# Patient Record
Sex: MALE | Race: BLACK OR AFRICAN AMERICAN | NOT HISPANIC OR LATINO | URBAN - METROPOLITAN AREA
[De-identification: names, ages, dates, MRNs, and addresses within clinical notes are randomized per-mention and may not be internally consistent; named-entity substitution may affect disease eponyms.]

---

## 2017-06-01 ENCOUNTER — OUTPATIENT (OUTPATIENT)
Dept: OUTPATIENT SERVICES | Facility: HOSPITAL | Age: 57
LOS: 1 days | End: 2017-06-01
Payer: MEDICAID

## 2017-06-01 DIAGNOSIS — K27.7 CHRONIC PEPTIC ULCER, SITE UNSPECIFIED, WITHOUT HEMORRHAGE OR PERFORATION: Chronic | ICD-10-CM

## 2017-06-01 PROCEDURE — G9001: CPT

## 2017-06-08 DIAGNOSIS — R69 ILLNESS, UNSPECIFIED: ICD-10-CM

## 2017-12-07 VITALS
SYSTOLIC BLOOD PRESSURE: 142 MMHG | DIASTOLIC BLOOD PRESSURE: 76 MMHG | TEMPERATURE: 99 F | HEART RATE: 80 BPM | RESPIRATION RATE: 18 BRPM | OXYGEN SATURATION: 99 %

## 2017-12-07 NOTE — H&P ADULT - NEGATIVE CARDIOVASCULAR SYMPTOMS
no orthopnea/no peripheral edema/no claudication/no paroxysmal nocturnal dyspnea/no palpitations/no chest pain

## 2017-12-07 NOTE — H&P ADULT - ASSESSMENT
56yo male, former smoker, with PMHx of HTN, schizophrenia/psychosis presents for cardiac catheterization with possible intervention if clinically indicated due to patient's risk factors, CCS Angina Class III Equivalent symptoms and abnormal NST.     -Pt remains compliant with his ASA/Plavix regimen prescribed 11/30/2017. Did not take today and will give prior to procedure  -Pt reports he adheres to his current medication regimen and importance of ASA/Plavix discussed with patient if intervention performed.   Risks & benefits of procedure and alternative therapy have been explained to the patient including but not limited to: allergic reaction, bleeding w/possible need for blood transfusion, infection, renal and vascular compromise, limb damage, arrhythmia, stroke, vessel dissection/perforation, Myocardial infarction, emergent CABG. Informed consent obtained and in chart. 58yo male, former smoker, with PMHx of HTN, schizophrenia/psychosis presents for cardiac catheterization with possible intervention if clinically indicated due to patient's risk factors, CCS Angina Class III Equivalent symptoms and abnormal NST.     -Pt remains compliant with his ASA/Plavix regimen prescribed 11/30/2017. Did not take today and will give prior to procedure  -Pt reports he adheres to his current medication regimen and importance of ASA/Plavix discussed with patient if intervention performed.   -WBC today 13.3K, remains afebrile, asymptomatic. No leukocytosis present on labs from 12/1/2017. Dr. Zhou aware  Risks & benefits of procedure and alternative therapy have been explained to the patient including but not limited to: allergic reaction, bleeding w/possible need for blood transfusion, infection, renal and vascular compromise, limb damage, arrhythmia, stroke, vessel dissection/perforation, Myocardial infarction, emergent CABG. Informed consent obtained and in chart. 58yo male, former smoker, with PMHx of HTN, schizophrenia/psychosis presents for cardiac catheterization with possible intervention if clinically indicated due to patient's risk factors, CCS Angina Class III Equivalent symptoms on amlodipine and abnormal NST.     -Pt remains compliant with his ASA/Plavix regimen prescribed 11/30/2017. Did not take today and will give prior to procedure  -Pt reports he adheres to his current medication regimen and importance of ASA/Plavix discussed with patient if intervention performed.   -WBC today 13.3K, remains afebrile, asymptomatic. No leukocytosis present on labs from 12/1/2017. Dr. Zhou aware  Risks & benefits of procedure and alternative therapy have been explained to the patient including but not limited to: allergic reaction, bleeding w/possible need for blood transfusion, infection, renal and vascular compromise, limb damage, arrhythmia, stroke, vessel dissection/perforation, Myocardial infarction, emergent CABG. Informed consent obtained and in chart.

## 2017-12-07 NOTE — H&P ADULT - HISTORY OF PRESENT ILLNESS
56yo male with PMHx of HTN who presented to the cardiologist with c/o HUNTER on moderate exertion.    Patient underwent a NST 11/29" revealing a small to moderate, partially reversible myocardial perfusion defect in the inferior and inferolateral wall, EF 60%.    Echo 11/14: revealing EF 54%, grade 1 diastolic dysfunction.     In light of patients CCS III anginal equivilant symptoms and abnormal NST he is now referred to [facility] for recommended cardiac cath. Hx obtained from Padmaja (sister) - poor historian, please confirm symptoms w/ patient on arrival.    Confirm meds on arrival.    58yo male, former smoker, with PMHx of HTN, ?schizophrenia/psychosis who presented to the cardiologist with c/o SOB upon walking 2 blocks and releived upon resting.  Patient also with complaint of cough at night which his sister states is from his smoking history (denies inhalers).  Denies any CP, palpitations, dizziness, syncope, LE swelling, orthopnea, fevers, chills.  Patient underwent a NST 11/29: revealing a small to moderate, partially reversible myocardial perfusion defect in the inferior and inferolateral wall, EF 60%.  Echo 11/14: revealing EF 54%, grade 1 diastolic dysfunction.     In light of patients CCS III anginal equivalent symptoms and abnormal NST he is now referred to [facility] for recommended cardiac cath. Hx obtained from Padmaja (sister) - poor historian and patient    56yo male, former smoker, with PMHx of HTN, schizophrenia/psychosis who presented to the cardiologist with c/o SOB upon walking 2 blocks relieved with rest. Patient also with complaint of cough at night which his sister states is from his smoking history (denies inhalers).  Denies any CP, palpitations, dizziness, syncope, LE swelling, orthopnea, fevers, chills.  Patient underwent a NST 11/29 revealing a small to moderate, partially reversible myocardial perfusion defect in the inferior and inferolateral wall, EF 60%.  Echo 11/14/17 revealed EF 54%, grade 1 diastolic dysfunction. In light of patients CCS III anginal equivalent symptoms and abnormal NST he is now referred to [facility] for recommended cardiac cath.

## 2017-12-07 NOTE — H&P ADULT - RS GEN PE MLT RESP DETAILS PC
clear to auscultation bilaterally/normal/respirations non-labored/airway patent/breath sounds equal/good air movement

## 2017-12-19 ENCOUNTER — INPATIENT (INPATIENT)
Facility: HOSPITAL | Age: 57
LOS: 0 days | Discharge: HOME | DRG: 247 | End: 2017-12-20
Attending: INTERNAL MEDICINE | Admitting: INTERNAL MEDICINE
Payer: MEDICAID

## 2017-12-19 DIAGNOSIS — K27.7 CHRONIC PEPTIC ULCER, SITE UNSPECIFIED, WITHOUT HEMORRHAGE OR PERFORATION: Chronic | ICD-10-CM

## 2017-12-19 LAB
ALBUMIN SERPL ELPH-MCNC: 4.3 G/DL — SIGNIFICANT CHANGE UP (ref 3.3–5)
ALP SERPL-CCNC: 66 U/L — SIGNIFICANT CHANGE UP (ref 40–120)
ALT FLD-CCNC: 28 U/L — SIGNIFICANT CHANGE UP (ref 10–45)
ANION GAP SERPL CALC-SCNC: 13 MMOL/L — SIGNIFICANT CHANGE UP (ref 5–17)
APTT BLD: 42.1 SEC — HIGH (ref 27.5–37.4)
AST SERPL-CCNC: 22 U/L — SIGNIFICANT CHANGE UP (ref 10–40)
BASOPHILS NFR BLD AUTO: 0.3 % — SIGNIFICANT CHANGE UP (ref 0–2)
BILIRUB SERPL-MCNC: 0.5 MG/DL — SIGNIFICANT CHANGE UP (ref 0.2–1.2)
BUN SERPL-MCNC: 12 MG/DL — SIGNIFICANT CHANGE UP (ref 7–23)
CALCIUM SERPL-MCNC: 8.7 MG/DL — SIGNIFICANT CHANGE UP (ref 8.4–10.5)
CHLORIDE SERPL-SCNC: 103 MMOL/L — SIGNIFICANT CHANGE UP (ref 96–108)
CHOLEST SERPL-MCNC: 189 MG/DL — SIGNIFICANT CHANGE UP (ref 10–199)
CO2 SERPL-SCNC: 23 MMOL/L — SIGNIFICANT CHANGE UP (ref 22–31)
CREAT SERPL-MCNC: 0.93 MG/DL — SIGNIFICANT CHANGE UP (ref 0.5–1.3)
CRP SERPL-MCNC: 0.2 MG/DL — SIGNIFICANT CHANGE UP (ref 0–0.4)
EOSINOPHIL NFR BLD AUTO: 2.9 % — SIGNIFICANT CHANGE UP (ref 0–6)
GLUCOSE SERPL-MCNC: 134 MG/DL — HIGH (ref 70–99)
HBA1C BLD-MCNC: 6.4 % — HIGH (ref 4–5.6)
HCT VFR BLD CALC: 37.5 % — LOW (ref 39–50)
HDLC SERPL-MCNC: 37 MG/DL — LOW (ref 40–125)
HGB BLD-MCNC: 13.5 G/DL — SIGNIFICANT CHANGE UP (ref 13–17)
INR BLD: 0.97 — SIGNIFICANT CHANGE UP (ref 0.88–1.16)
LIPID PNL WITH DIRECT LDL SERPL: 105 MG/DL — SIGNIFICANT CHANGE UP
LYMPHOCYTES # BLD AUTO: 20.6 % — SIGNIFICANT CHANGE UP (ref 13–44)
MCHC RBC-ENTMCNC: 32.7 PG — SIGNIFICANT CHANGE UP (ref 27–34)
MCHC RBC-ENTMCNC: 36 G/DL — SIGNIFICANT CHANGE UP (ref 32–36)
MCV RBC AUTO: 90.8 FL — SIGNIFICANT CHANGE UP (ref 80–100)
MONOCYTES NFR BLD AUTO: 8.3 % — SIGNIFICANT CHANGE UP (ref 2–14)
NEUTROPHILS NFR BLD AUTO: 67.9 % — SIGNIFICANT CHANGE UP (ref 43–77)
PLATELET # BLD AUTO: 231 K/UL — SIGNIFICANT CHANGE UP (ref 150–400)
POTASSIUM SERPL-MCNC: 4.2 MMOL/L — SIGNIFICANT CHANGE UP (ref 3.5–5.3)
POTASSIUM SERPL-SCNC: 4.2 MMOL/L — SIGNIFICANT CHANGE UP (ref 3.5–5.3)
PROT SERPL-MCNC: 7 G/DL — SIGNIFICANT CHANGE UP (ref 6–8.3)
PROTHROM AB SERPL-ACNC: 10.8 SEC — SIGNIFICANT CHANGE UP (ref 9.8–12.7)
RBC # BLD: 4.13 M/UL — LOW (ref 4.2–5.8)
RBC # FLD: 13.1 % — SIGNIFICANT CHANGE UP (ref 10.3–16.9)
SODIUM SERPL-SCNC: 139 MMOL/L — SIGNIFICANT CHANGE UP (ref 135–145)
TOTAL CHOLESTEROL/HDL RATIO MEASUREMENT: 5.1 RATIO — SIGNIFICANT CHANGE UP (ref 3.4–9.6)
TRIGL SERPL-MCNC: 235 MG/DL — HIGH (ref 10–149)
WBC # BLD: 13.3 K/UL — HIGH (ref 3.8–10.5)
WBC # FLD AUTO: 13.3 K/UL — HIGH (ref 3.8–10.5)

## 2017-12-19 PROCEDURE — 93010 ELECTROCARDIOGRAM REPORT: CPT

## 2017-12-19 PROCEDURE — 93458 L HRT ARTERY/VENTRICLE ANGIO: CPT | Mod: 26,XU

## 2017-12-19 PROCEDURE — 92928 PRQ TCAT PLMT NTRAC ST 1 LES: CPT | Mod: LD

## 2017-12-19 PROCEDURE — 93571 IV DOP VEL&/PRESS C FLO 1ST: CPT | Mod: 26,LD

## 2017-12-19 RX ORDER — BENZTROPINE MESYLATE 1 MG
2 TABLET ORAL AT BEDTIME
Qty: 0 | Refills: 0 | Status: DISCONTINUED | OUTPATIENT
Start: 2017-12-19 | End: 2017-12-20

## 2017-12-19 RX ORDER — ASPIRIN/CALCIUM CARB/MAGNESIUM 324 MG
81 TABLET ORAL DAILY
Qty: 0 | Refills: 0 | Status: DISCONTINUED | OUTPATIENT
Start: 2017-12-20 | End: 2017-12-20

## 2017-12-19 RX ORDER — CLOPIDOGREL BISULFATE 75 MG/1
75 TABLET, FILM COATED ORAL ONCE
Qty: 0 | Refills: 0 | Status: COMPLETED | OUTPATIENT
Start: 2017-12-19 | End: 2017-12-19

## 2017-12-19 RX ORDER — ASPIRIN/CALCIUM CARB/MAGNESIUM 324 MG
325 TABLET ORAL ONCE
Qty: 0 | Refills: 0 | Status: COMPLETED | OUTPATIENT
Start: 2017-12-19 | End: 2017-12-19

## 2017-12-19 RX ORDER — AMLODIPINE BESYLATE 2.5 MG/1
2.5 TABLET ORAL DAILY
Qty: 0 | Refills: 0 | Status: DISCONTINUED | OUTPATIENT
Start: 2017-12-19 | End: 2017-12-20

## 2017-12-19 RX ORDER — SODIUM CHLORIDE 9 MG/ML
1000 INJECTION INTRAMUSCULAR; INTRAVENOUS; SUBCUTANEOUS
Qty: 0 | Refills: 0 | Status: DISCONTINUED | OUTPATIENT
Start: 2017-12-19 | End: 2017-12-20

## 2017-12-19 RX ORDER — LISINOPRIL 2.5 MG/1
20 TABLET ORAL DAILY
Qty: 0 | Refills: 0 | Status: DISCONTINUED | OUTPATIENT
Start: 2017-12-19 | End: 2017-12-20

## 2017-12-19 RX ORDER — SODIUM CHLORIDE 9 MG/ML
500 INJECTION INTRAMUSCULAR; INTRAVENOUS; SUBCUTANEOUS
Qty: 0 | Refills: 0 | Status: DISCONTINUED | OUTPATIENT
Start: 2017-12-19 | End: 2017-12-19

## 2017-12-19 RX ORDER — HALOPERIDOL DECANOATE 100 MG/ML
10 INJECTION INTRAMUSCULAR AT BEDTIME
Qty: 0 | Refills: 0 | Status: DISCONTINUED | OUTPATIENT
Start: 2017-12-19 | End: 2017-12-20

## 2017-12-19 RX ORDER — CLOPIDOGREL BISULFATE 75 MG/1
75 TABLET, FILM COATED ORAL DAILY
Qty: 0 | Refills: 0 | Status: DISCONTINUED | OUTPATIENT
Start: 2017-12-20 | End: 2017-12-20

## 2017-12-19 RX ADMIN — Medication 2 MILLIGRAM(S): at 22:18

## 2017-12-19 RX ADMIN — SODIUM CHLORIDE 75 MILLILITER(S): 9 INJECTION INTRAMUSCULAR; INTRAVENOUS; SUBCUTANEOUS at 08:14

## 2017-12-19 RX ADMIN — SODIUM CHLORIDE 75 MILLILITER(S): 9 INJECTION INTRAMUSCULAR; INTRAVENOUS; SUBCUTANEOUS at 10:00

## 2017-12-19 RX ADMIN — AMLODIPINE BESYLATE 2.5 MILLIGRAM(S): 2.5 TABLET ORAL at 12:04

## 2017-12-19 RX ADMIN — HALOPERIDOL DECANOATE 10 MILLIGRAM(S): 100 INJECTION INTRAMUSCULAR at 22:18

## 2017-12-19 RX ADMIN — CLOPIDOGREL BISULFATE 75 MILLIGRAM(S): 75 TABLET, FILM COATED ORAL at 08:13

## 2017-12-19 RX ADMIN — Medication 325 MILLIGRAM(S): at 08:14

## 2017-12-19 RX ADMIN — LISINOPRIL 20 MILLIGRAM(S): 2.5 TABLET ORAL at 12:04

## 2017-12-20 ENCOUNTER — TRANSCRIPTION ENCOUNTER (OUTPATIENT)
Age: 57
End: 2017-12-20

## 2017-12-20 VITALS
DIASTOLIC BLOOD PRESSURE: 72 MMHG | HEART RATE: 86 BPM | OXYGEN SATURATION: 95 % | RESPIRATION RATE: 16 BRPM | SYSTOLIC BLOOD PRESSURE: 121 MMHG | TEMPERATURE: 98 F

## 2017-12-20 LAB
ANION GAP SERPL CALC-SCNC: 13 MMOL/L — SIGNIFICANT CHANGE UP (ref 5–17)
BUN SERPL-MCNC: 13 MG/DL — SIGNIFICANT CHANGE UP (ref 7–23)
CALCIUM SERPL-MCNC: 9.1 MG/DL — SIGNIFICANT CHANGE UP (ref 8.4–10.5)
CHLORIDE SERPL-SCNC: 102 MMOL/L — SIGNIFICANT CHANGE UP (ref 96–108)
CO2 SERPL-SCNC: 24 MMOL/L — SIGNIFICANT CHANGE UP (ref 22–31)
CREAT SERPL-MCNC: 0.9 MG/DL — SIGNIFICANT CHANGE UP (ref 0.5–1.3)
GLUCOSE BLDC GLUCOMTR-MCNC: 111 MG/DL — HIGH (ref 70–99)
GLUCOSE SERPL-MCNC: 129 MG/DL — HIGH (ref 70–99)
HCT VFR BLD CALC: 41.7 % — SIGNIFICANT CHANGE UP (ref 39–50)
HGB BLD-MCNC: 14.6 G/DL — SIGNIFICANT CHANGE UP (ref 13–17)
MAGNESIUM SERPL-MCNC: 2.1 MG/DL — SIGNIFICANT CHANGE UP (ref 1.6–2.6)
MCHC RBC-ENTMCNC: 32.1 PG — SIGNIFICANT CHANGE UP (ref 27–34)
MCHC RBC-ENTMCNC: 35 G/DL — SIGNIFICANT CHANGE UP (ref 32–36)
MCV RBC AUTO: 91.6 FL — SIGNIFICANT CHANGE UP (ref 80–100)
PLATELET # BLD AUTO: 232 K/UL — SIGNIFICANT CHANGE UP (ref 150–400)
POTASSIUM SERPL-MCNC: 4.3 MMOL/L — SIGNIFICANT CHANGE UP (ref 3.5–5.3)
POTASSIUM SERPL-SCNC: 4.3 MMOL/L — SIGNIFICANT CHANGE UP (ref 3.5–5.3)
RBC # BLD: 4.55 M/UL — SIGNIFICANT CHANGE UP (ref 4.2–5.8)
RBC # FLD: 12.6 % — SIGNIFICANT CHANGE UP (ref 10.3–16.9)
SODIUM SERPL-SCNC: 139 MMOL/L — SIGNIFICANT CHANGE UP (ref 135–145)
WBC # BLD: 8.8 K/UL — SIGNIFICANT CHANGE UP (ref 3.8–10.5)
WBC # FLD AUTO: 8.8 K/UL — SIGNIFICANT CHANGE UP (ref 3.8–10.5)

## 2017-12-20 PROCEDURE — 36415 COLL VENOUS BLD VENIPUNCTURE: CPT

## 2017-12-20 PROCEDURE — 80061 LIPID PANEL: CPT

## 2017-12-20 PROCEDURE — 86140 C-REACTIVE PROTEIN: CPT

## 2017-12-20 PROCEDURE — 85610 PROTHROMBIN TIME: CPT

## 2017-12-20 PROCEDURE — C1769: CPT

## 2017-12-20 PROCEDURE — 80053 COMPREHEN METABOLIC PANEL: CPT

## 2017-12-20 PROCEDURE — C1889: CPT

## 2017-12-20 PROCEDURE — C1887: CPT

## 2017-12-20 PROCEDURE — C1725: CPT

## 2017-12-20 PROCEDURE — 83036 HEMOGLOBIN GLYCOSYLATED A1C: CPT

## 2017-12-20 PROCEDURE — 85025 COMPLETE CBC W/AUTO DIFF WBC: CPT

## 2017-12-20 PROCEDURE — 93010 ELECTROCARDIOGRAM REPORT: CPT

## 2017-12-20 PROCEDURE — 80048 BASIC METABOLIC PNL TOTAL CA: CPT

## 2017-12-20 PROCEDURE — 85027 COMPLETE CBC AUTOMATED: CPT

## 2017-12-20 PROCEDURE — C1874: CPT

## 2017-12-20 PROCEDURE — 83735 ASSAY OF MAGNESIUM: CPT

## 2017-12-20 PROCEDURE — 99235 HOSP IP/OBS SAME DATE MOD 70: CPT

## 2017-12-20 PROCEDURE — 82962 GLUCOSE BLOOD TEST: CPT

## 2017-12-20 PROCEDURE — 93005 ELECTROCARDIOGRAM TRACING: CPT

## 2017-12-20 PROCEDURE — 85730 THROMBOPLASTIN TIME PARTIAL: CPT

## 2017-12-20 RX ORDER — ASPIRIN/CALCIUM CARB/MAGNESIUM 324 MG
1 TABLET ORAL
Qty: 0 | Refills: 0 | COMMUNITY

## 2017-12-20 RX ORDER — LISINOPRIL 2.5 MG/1
1 TABLET ORAL
Qty: 0 | Refills: 0 | COMMUNITY

## 2017-12-20 RX ORDER — CLOPIDOGREL BISULFATE 75 MG/1
1 TABLET, FILM COATED ORAL
Qty: 30 | Refills: 11
Start: 2017-12-20 | End: 2018-12-14

## 2017-12-20 RX ORDER — ATORVASTATIN CALCIUM 80 MG/1
1 TABLET, FILM COATED ORAL
Qty: 30 | Refills: 3
Start: 2017-12-20 | End: 2018-04-18

## 2017-12-20 RX ORDER — CLOPIDOGREL BISULFATE 75 MG/1
1 TABLET, FILM COATED ORAL
Qty: 0 | Refills: 0 | COMMUNITY

## 2017-12-20 RX ORDER — ASPIRIN/CALCIUM CARB/MAGNESIUM 324 MG
1 TABLET ORAL
Qty: 30 | Refills: 11
Start: 2017-12-20 | End: 2018-12-14

## 2017-12-20 RX ORDER — LISINOPRIL 2.5 MG/1
1 TABLET ORAL
Qty: 30 | Refills: 2
Start: 2017-12-20 | End: 2018-03-19

## 2017-12-20 RX ADMIN — CLOPIDOGREL BISULFATE 75 MILLIGRAM(S): 75 TABLET, FILM COATED ORAL at 12:12

## 2017-12-20 RX ADMIN — Medication 81 MILLIGRAM(S): at 12:12

## 2017-12-20 RX ADMIN — LISINOPRIL 20 MILLIGRAM(S): 2.5 TABLET ORAL at 05:13

## 2017-12-20 RX ADMIN — AMLODIPINE BESYLATE 2.5 MILLIGRAM(S): 2.5 TABLET ORAL at 05:13

## 2017-12-20 NOTE — DISCHARGE NOTE ADULT - HOSPITAL COURSE
Patient is a 58yo M, former smoker, with PMHx of HTN and schizophrenia/psychosis who presented to his cardiologist with c/o SOB upon walking 2 blocks relieved with rest. Patient also with complaint of cough at night which his sister states is from his smoking history (denies inhalers).  Denies any CP, palpitations, dizziness, syncope, LE swelling, orthopnea, fevers, chills.  Patient underwent a NST 11/29 revealing a small to moderate, partially reversible myocardial perfusion defect in the inferior and inferolateral wall, EF 60%.  Echo 11/14/17 revealed EF 54%, grade 1 diastolic dysfunction. In light of patients CCS III anginal equivalent symptoms and abnormal NST he was now referred to [facility] for recommended cardiac cath. Patient underwent cardiac cath on 12/19/17 and received OLIVIER midLAD (FFR 0.78), EF 55%, right radial access. Patient was seen and examined this AM and was asymptomatic without complaints. Patient's VSS, labs wnl and no events overnight on telemetry. Patient is stable for discharge per Dr. Mora. Patient was diagnosed with pre-DM for which he has been instructed to follow up with his PMD for close blood glucose monitoring. Patient was started on lipitor 40mg QHS for HLD. Patient's medications, including asa/plavix, have been e-prescribed to his preferred pharmacy. Patient has been given appropriate discharge instructions including medication regimen, access site management and follow up.    Temp 97.8F, HR 74bpm, /75, RR 16, O2 sat 95% RA  Gen: NAD, A&O x 3  Cards: RRR, clear S1 and S2 without murmur  Pulm: CTA b/l without w/r/r  Abd: soft, NT  Ext: No LE edema or ulcerations b/l

## 2017-12-20 NOTE — DISCHARGE NOTE ADULT - MEDICATION SUMMARY - MEDICATIONS TO TAKE
I will START or STAY ON the medications listed below when I get home from the hospital:    Aspirin Enteric Coated 81 mg oral delayed release tablet  -- 1 tab(s) by mouth once a day  -- Indication: For Coronary artery disease, keeps stent open    lisinopril 20 mg oral tablet  -- 1 tab(s) by mouth once a day  -- Indication: For High blood pressure    Lipitor 40 mg oral tablet  -- 1 tab(s) by mouth once a day   -- Avoid grapefruit and grapefruit juice while taking this medication.  Do not take this drug if you are pregnant.  It is very important that you take or use this exactly as directed.  Do not skip doses or discontinue unless directed by your doctor.  Obtain medical advice before taking any non-prescription drugs as some may affect the action of this medication.  Take with food or milk.    -- Indication: For High cholesterol    benztropine 2 mg oral tablet  -- 1 tab(s) by mouth once a day (at bedtime)  -- Indication: For as prescribed    Plavix 75 mg oral tablet  -- 1 tab(s) by mouth once a day  -- Indication: For Coronary artery disease, keeps stent open    haloperidol 10 mg oral tablet  -- 1 tab(s) by mouth once a day (at bedtime)  -- Indication: For as prescribed    amLODIPine 2.5 mg oral tablet  -- 1 tab(s) by mouth once a day  -- Indication: For High blood pressure I will START or STAY ON the medications listed below when I get home from the hospital:    Aspirin Enteric Coated 81 mg oral delayed release tablet  -- 1 tab(s) by mouth once a day  -- Indication: For Coronary artery disease, keeps stent open    lisinopril 20 mg oral tablet  -- 1 tab(s) by mouth once a day  -- Indication: For High blood pressure, protects the heart    Lipitor 40 mg oral tablet  -- 1 tab(s) by mouth once a day   -- Avoid grapefruit and grapefruit juice while taking this medication.  Do not take this drug if you are pregnant.  It is very important that you take or use this exactly as directed.  Do not skip doses or discontinue unless directed by your doctor.  Obtain medical advice before taking any non-prescription drugs as some may affect the action of this medication.  Take with food or milk.    -- Indication: For High cholesterol    benztropine 2 mg oral tablet  -- 1 tab(s) by mouth once a day (at bedtime)  -- Indication: For as prescribed    Plavix 75 mg oral tablet  -- 1 tab(s) by mouth once a day  -- Indication: For Coronary artery disease, keeps stent open    haloperidol 10 mg oral tablet  -- 1 tab(s) by mouth once a day (at bedtime)  -- Indication: For as prescribed    amLODIPine 2.5 mg oral tablet  -- 1 tab(s) by mouth once a day  -- Indication: For High blood pressure

## 2017-12-20 NOTE — DISCHARGE NOTE ADULT - CARE PLAN
Principal Discharge DX:	Coronary artery disease  Goal:	continue medications, follow up  Instructions for follow-up, activity and diet:	You underwent a cardiac angiogram and received a stent to the left anterior descending artery. PLEASE CONTINUE ASPIRIN 81MG DAILY AND PLAVIX 75MG DAILY. DO NOT STOP THESE MEDICATIONS FOR ANY REASON AS THEY ARE KEEPING YOUR STENT OPEN AND PREVENTING A HEART ATTACK. Avoid strenuous activity or heavy lifting for the next five days. Do not take a bath or swim for the next five days; you may shower. For any bleeding or hematoma formation (hardened blood collection under the skin) at the access site of right radial please hold pressure and go to the emergency room. Please follow up with Dr. Zhou at [facility] in 1-2 weeks. For recurrent chest pain, please call your doctor or go to the emergency room.  Secondary Diagnosis:	Hypertension  Goal:	continue medications  Instructions for follow-up, activity and diet:	Please continue medications as listed to keep your blood pressure controlled. For blood pressure that is too high or too low please see your doctor or go to the emergency room as necessary.  Secondary Diagnosis:	Hyperlipidemia  Goal:	continue medications  Instructions for follow-up, activity and diet:	Please start lipitor (atorvastatin) to keep your cholesterol low. High cholesterol contributes to heart disease.  Secondary Diagnosis:	Pre-diabetes  Goal:	follow up  Instructions for follow-up, activity and diet:	You have been diagnosed with pre-diabetes. Your hemoglobin A1C (measures blood sugar over three months) was 6.4. You are diagnosed with diabetes if your hemoglobin A1C is 6.5 or higher. Please maintain a low carbohydrate diet. Follow up with your primary care doctor for close monitoring of your blood glucose.

## 2017-12-20 NOTE — DISCHARGE NOTE ADULT - CARE PROVIDER_API CALL
Owen Zhou), Cardiovascular Disease; Internal Medicine; Interventional Cardiology  [address] [place] [address]  Phone: [phone]  Fax: [phone]

## 2017-12-20 NOTE — DISCHARGE NOTE ADULT - PATIENT PORTAL LINK FT
“You can access the FollowHealth Patient Portal, offered by [facility], by registering with the following website: [email or web address]”

## 2017-12-20 NOTE — DISCHARGE NOTE ADULT - PLAN OF CARE
continue medications, follow up You underwent a cardiac angiogram and received a stent to the left anterior descending artery. PLEASE CONTINUE ASPIRIN 81MG DAILY AND PLAVIX 75MG DAILY. DO NOT STOP THESE MEDICATIONS FOR ANY REASON AS THEY ARE KEEPING YOUR STENT OPEN AND PREVENTING A HEART ATTACK. Avoid strenuous activity or heavy lifting for the next five days. Do not take a bath or swim for the next five days; you may shower. For any bleeding or hematoma formation (hardened blood collection under the skin) at the access site of right radial please hold pressure and go to the emergency room. Please follow up with Dr. Zhou at [facility] in 1-2 weeks. For recurrent chest pain, please call your doctor or go to the emergency room. continue medications Please continue medications as listed to keep your blood pressure controlled. For blood pressure that is too high or too low please see your doctor or go to the emergency room as necessary. Please start lipitor (atorvastatin) to keep your cholesterol low. High cholesterol contributes to heart disease. follow up You have been diagnosed with pre-diabetes. Your hemoglobin A1C (measures blood sugar over three months) was 6.4. You are diagnosed with diabetes if your hemoglobin A1C is 6.5 or higher. Please maintain a low carbohydrate diet. Follow up with your primary care doctor for close monitoring of your blood glucose.

## 2017-12-26 DIAGNOSIS — Z79.82 LONG TERM (CURRENT) USE OF ASPIRIN: ICD-10-CM

## 2017-12-26 DIAGNOSIS — Z87.891 PERSONAL HISTORY OF NICOTINE DEPENDENCE: ICD-10-CM

## 2017-12-26 DIAGNOSIS — I10 ESSENTIAL (PRIMARY) HYPERTENSION: ICD-10-CM

## 2017-12-26 DIAGNOSIS — Z87.11 PERSONAL HISTORY OF PEPTIC ULCER DISEASE: ICD-10-CM

## 2017-12-26 DIAGNOSIS — F20.9 SCHIZOPHRENIA, UNSPECIFIED: ICD-10-CM

## 2017-12-26 DIAGNOSIS — I25.110 ATHEROSCLEROTIC HEART DISEASE OF NATIVE CORONARY ARTERY WITH UNSTABLE ANGINA PECTORIS: ICD-10-CM

## 2017-12-26 DIAGNOSIS — R73.03 PREDIABETES: ICD-10-CM

## 2017-12-26 DIAGNOSIS — Z86.59 PERSONAL HISTORY OF OTHER MENTAL AND BEHAVIORAL DISORDERS: ICD-10-CM

## 2017-12-26 DIAGNOSIS — E78.5 HYPERLIPIDEMIA, UNSPECIFIED: ICD-10-CM

## 2019-09-17 ENCOUNTER — RX RENEWAL (OUTPATIENT)
Age: 59
End: 2019-09-17

## 2019-11-13 ENCOUNTER — RX RENEWAL (OUTPATIENT)
Age: 59
End: 2019-11-13

## 2021-03-29 PROBLEM — F29 UNSPECIFIED PSYCHOSIS NOT DUE TO A SUBSTANCE OR KNOWN PHYSIOLOGICAL CONDITION: Chronic | Status: ACTIVE | Noted: 2017-12-07

## 2021-03-29 PROBLEM — I10 ESSENTIAL (PRIMARY) HYPERTENSION: Chronic | Status: ACTIVE | Noted: 2017-12-07

## 2021-04-02 VITALS
DIASTOLIC BLOOD PRESSURE: 85 MMHG | HEART RATE: 73 BPM | SYSTOLIC BLOOD PRESSURE: 123 MMHG | TEMPERATURE: 98 F | WEIGHT: 195.99 LBS | OXYGEN SATURATION: 95 % | HEIGHT: 69 IN | RESPIRATION RATE: 16 BRPM

## 2021-04-02 RX ORDER — CHLORHEXIDINE GLUCONATE 213 G/1000ML
1 SOLUTION TOPICAL ONCE
Refills: 0 | Status: DISCONTINUED | OUTPATIENT
Start: 2021-04-06 | End: 2021-04-20

## 2021-04-02 NOTE — H&P ADULT - ASSESSMENT
59 y/o M, poor historian, with PMH of schizophrenia/psychosis, HTN, HLD, DM, CAD s/p PCI mLAD @ [facility] 2017 who presents for left heart cardiac catheterization due to patient's risk factors and abnormal NST.       - EKG:  NSR @ 74 bpm, no acute ST -T wave changes    - ASA: III          Mallampati: III  - H/H stable: 14.2/42.2       Platelets/Coags stable. Cr: 0.81   - Patient denies hematochieza, hematuria, easy bruising, or signs of bleeding.   - Patient loaded with Aspirin 81 mg PO x 1 and Plavix 75 mg PO x 1   - Patient started on IV NS @ 75 cc/hr x 4 hours   - Hgb a1c: 6.7      Started on: MISS   - Patient is a suitable candidate for moderate sedation.     Risks & benefits of procedure and alternative therapy have been explained to the patient including but not limited to: allergic reaction, bleeding w/possible need for blood transfusion, infection, renal and vascular compromise, limb damage, arrhythmia, stroke, vessel dissection/perforation, Myocardial infarction, emergent CABG. Informed consent obtained and in chart.

## 2021-04-02 NOTE — H&P ADULT - RS GEN PE MLT RESP DETAILS PC
airway patent/breath sounds equal/good air movement/respirations non-labored/clear to auscultation bilaterally/no chest wall tenderness/no rales/no rhonchi

## 2021-04-02 NOTE — H&P ADULT - HISTORY OF PRESENT ILLNESS
Cardiologist: Dr. Zhou  Pharmacy:   Escort:   COVID:    59 y/o M with PMH of schizophrenia/psychosis,  HTN, HLD, DM, , CAD s/p PCI LAD @ [facility] 2017  Who presented to his cardiologist Dr. Zhou with CC of HUNTRE X ….  Pt. denies any …..   ECHO 11/23/20 revealed EF 54 %, grade 1 diastolic dysfunction. NST 11/23/20 as per MD note revealed small to moderate partially reversible myocardial perfusion defect of the inferior and inferolateral wall, EF 60 %.   In light of patients risk factors, CCS class __ sx and abnormal NST, pt is now referred for cardiac cath with possible intervention.    Cardiologist: Dr. Zhou  Pharmacy:   Escort:   COVID:   *Verify Meds*  59 y/o M, poor historian, with PMH of schizophrenia/psychosis, HTN, HLD, DM, CAD s/p PCI mLAD @ [facility] 2017 who presented to outpatient cardiologist Dr. Zhou with complaints of   Who presented to his cardiologist Dr. Zhou with CC of HUNTER X ….  Pt. denies any …..   ECHO 11/23/20 revealed EF 54 %, grade 1 diastolic dysfunction. NST 11/23/20 as per MD note revealed small to moderate partially reversible myocardial perfusion defect of the inferior and inferolateral wall, EF 60 %.   In light of patients risk factors, CCS class __ sx and abnormal NST, pt is now referred for cardiac cath with possible intervention.    Cardiologist: Dr. Zhou  [facility]  Escort: family vs "will set something up"  COVID: Shanika 4/6 AM  *Verify Meds*  59 y/o M, poor historian, with PMH of schizophrenia/psychosis, HTN, HLD, DM, CAD s/p PCI mLAD @ [facility] 2017 who presented to outpatient cardiologist Dr. Zhou for routine follow up. Per patient he has been complietly ASX, however per MD note, patient complained of HUNTER. Patient denies CP, SOB, dizziness, diaphoresis, palpitations, orthopnea/PND, abdominal pain, N/V/D, urinary sx, BRBPR, hematuria, melena, LE swelling, recent travel/sick contacts/illness. ECHO 11/23/20 revealed EF 54 %, grade 1 diastolic dysfunction. NST 11/23/20 as per MD note revealed small to moderate partially reversible myocardial perfusion defect of the inferior and inferolateral wall, EF 60 %. In light of patients risk factors and abnormal NST despite being ASX, pt is now referred for cardiac cath with possible intervention.    Cardiologist: Dr. Zhou  [facility]  Escort: family vs "will set something up"  COVID: Shanika 4/6 AM  *Verify Meds*  61 y/o M, poor historian, with PMH of schizophrenia/psychosis, HTN, HLD, DM, CAD s/p PCI mLAD @ [facility] 2017 who presented to outpatient cardiologist Dr. Zhou for routine follow up. Per patient he has been complietly ASX, however per MD note, patient complained of HUNTER. Patient denies CP, SOB, dizziness, diaphoresis, palpitations, orthopnea/PND, abdominal pain, N/V/D, urinary sx, BRBPR, hematuria, melena, LE swelling, recent travel/sick contacts/illness. ECHO 11/23/20 revealed EF 54 %, grade 1 diastolic dysfunction. NST 11/23/20 as per MD note revealed small to moderate partially reversible myocardial perfusion defect of the inferior and inferolateral wall, EF 60 %. In light of patients risk factors and abnormal NST despite being ASX, pt is now referred for cardiac cath with possible intervention.   Cardiac Cath 12/19/17: LM normal, mLAD 60% (FFR 0.78), LCx luminal irregularities, RCA large/dominant w/ luminal irregularities. LVEF normal, EDP 6 Cardiologist: Dr. Zhou  Pharmacy: [facility]  Escort: family vs "will set something up"  COVID: Shanika 4/6 AM  61 y/o M, poor historian, current smoker, with PMH of schizophrenia/psychosis, HTN, HLD, DM, CAD s/p PCI mLAD @ [facility] 2017 who presented to outpatient cardiologist Dr. Zhou for routine follow up. Per patient he has been complietly ASX, however per MD note, patient complained of HUNTER. Patient denies CP, SOB, dizziness, diaphoresis, palpitations, orthopnea/PND, abdominal pain, N/V/D, urinary sx, BRBPR, hematuria, melena, LE swelling, recent travel/sick contacts/illness. ECHO 11/23/20 revealed EF 54 %, grade 1 diastolic dysfunction. NST 11/23/20 as per MD note revealed small to moderate partially reversible myocardial perfusion defect of the inferior and inferolateral wall, EF 60 %. In light of patients risk factors and abnormal NST despite being ASX, pt is now referred for cardiac cath with possible intervention.   Cardiac Cath 12/19/17: LM normal, mLAD 60% (FFR 0.78), LCx luminal irregularities, RCA large/dominant w/ luminal irregularities. LVEF normal, EDP 6

## 2021-04-06 ENCOUNTER — OUTPATIENT (OUTPATIENT)
Dept: OUTPATIENT SERVICES | Facility: HOSPITAL | Age: 61
LOS: 1 days | Discharge: HOME | End: 2021-04-06
Payer: MEDICAID

## 2021-04-06 DIAGNOSIS — K27.7 CHRONIC PEPTIC ULCER, SITE UNSPECIFIED, WITHOUT HEMORRHAGE OR PERFORATION: Chronic | ICD-10-CM

## 2021-04-06 LAB
A1C WITH ESTIMATED AVERAGE GLUCOSE RESULT: 6.7 % — HIGH (ref 4–5.6)
ALBUMIN SERPL ELPH-MCNC: 4.5 G/DL — SIGNIFICANT CHANGE UP (ref 3.3–5)
ALP SERPL-CCNC: 96 U/L — SIGNIFICANT CHANGE UP (ref 40–120)
ALT FLD-CCNC: 24 U/L — SIGNIFICANT CHANGE UP (ref 10–45)
ANION GAP SERPL CALC-SCNC: 10 MMOL/L — SIGNIFICANT CHANGE UP (ref 5–17)
APTT BLD: 30.4 SEC — SIGNIFICANT CHANGE UP (ref 27.5–35.5)
AST SERPL-CCNC: 22 U/L — SIGNIFICANT CHANGE UP (ref 10–40)
BASOPHILS # BLD AUTO: 0.02 K/UL — SIGNIFICANT CHANGE UP (ref 0–0.2)
BASOPHILS NFR BLD AUTO: 0.2 % — SIGNIFICANT CHANGE UP (ref 0–2)
BILIRUB DIRECT SERPL-MCNC: <0.2 MG/DL — SIGNIFICANT CHANGE UP (ref 0–0.2)
BILIRUB INDIRECT FLD-MCNC: SIGNIFICANT CHANGE UP MG/DL (ref 0.2–1)
BILIRUB SERPL-MCNC: 0.5 MG/DL — SIGNIFICANT CHANGE UP (ref 0.2–1.2)
BUN SERPL-MCNC: 16 MG/DL — SIGNIFICANT CHANGE UP (ref 7–23)
CALCIUM SERPL-MCNC: 9 MG/DL — SIGNIFICANT CHANGE UP (ref 8.4–10.5)
CHLORIDE SERPL-SCNC: 103 MMOL/L — SIGNIFICANT CHANGE UP (ref 96–108)
CHOLEST SERPL-MCNC: 126 MG/DL — SIGNIFICANT CHANGE UP
CK MB CFR SERPL CALC: 2.4 NG/ML — SIGNIFICANT CHANGE UP (ref 0–6.7)
CK SERPL-CCNC: 144 U/L — SIGNIFICANT CHANGE UP (ref 30–200)
CO2 SERPL-SCNC: 26 MMOL/L — SIGNIFICANT CHANGE UP (ref 22–31)
CREAT SERPL-MCNC: 0.81 MG/DL — SIGNIFICANT CHANGE UP (ref 0.5–1.3)
EOSINOPHIL # BLD AUTO: 0.24 K/UL — SIGNIFICANT CHANGE UP (ref 0–0.5)
EOSINOPHIL NFR BLD AUTO: 2.6 % — SIGNIFICANT CHANGE UP (ref 0–6)
ESTIMATED AVERAGE GLUCOSE: 146 MG/DL — HIGH (ref 68–114)
GLUCOSE SERPL-MCNC: 134 MG/DL — HIGH (ref 70–99)
HCT VFR BLD CALC: 42.2 % — SIGNIFICANT CHANGE UP (ref 39–50)
HDLC SERPL-MCNC: 38 MG/DL — LOW
HGB BLD-MCNC: 14.2 G/DL — SIGNIFICANT CHANGE UP (ref 13–17)
IMM GRANULOCYTES NFR BLD AUTO: 0.3 % — SIGNIFICANT CHANGE UP (ref 0–1.5)
INR BLD: 0.96 — SIGNIFICANT CHANGE UP (ref 0.88–1.16)
LIPID PNL WITH DIRECT LDL SERPL: 64 MG/DL — SIGNIFICANT CHANGE UP
LYMPHOCYTES # BLD AUTO: 1.83 K/UL — SIGNIFICANT CHANGE UP (ref 1–3.3)
LYMPHOCYTES # BLD AUTO: 19.6 % — SIGNIFICANT CHANGE UP (ref 13–44)
MCHC RBC-ENTMCNC: 31.8 PG — SIGNIFICANT CHANGE UP (ref 27–34)
MCHC RBC-ENTMCNC: 33.6 GM/DL — SIGNIFICANT CHANGE UP (ref 32–36)
MCV RBC AUTO: 94.6 FL — SIGNIFICANT CHANGE UP (ref 80–100)
MONOCYTES # BLD AUTO: 0.68 K/UL — SIGNIFICANT CHANGE UP (ref 0–0.9)
MONOCYTES NFR BLD AUTO: 7.3 % — SIGNIFICANT CHANGE UP (ref 2–14)
NEUTROPHILS # BLD AUTO: 6.55 K/UL — SIGNIFICANT CHANGE UP (ref 1.8–7.4)
NEUTROPHILS NFR BLD AUTO: 70 % — SIGNIFICANT CHANGE UP (ref 43–77)
NON HDL CHOLESTEROL: 88 MG/DL — SIGNIFICANT CHANGE UP
NRBC # BLD: 0 /100 WBCS — SIGNIFICANT CHANGE UP (ref 0–0)
PLATELET # BLD AUTO: 256 K/UL — SIGNIFICANT CHANGE UP (ref 150–400)
POTASSIUM SERPL-MCNC: 4 MMOL/L — SIGNIFICANT CHANGE UP (ref 3.5–5.3)
POTASSIUM SERPL-SCNC: 4 MMOL/L — SIGNIFICANT CHANGE UP (ref 3.5–5.3)
PROT SERPL-MCNC: 7.6 G/DL — SIGNIFICANT CHANGE UP (ref 6–8.3)
PROTHROM AB SERPL-ACNC: 11.5 SEC — SIGNIFICANT CHANGE UP (ref 10.6–13.6)
RBC # BLD: 4.46 M/UL — SIGNIFICANT CHANGE UP (ref 4.2–5.8)
RBC # FLD: 12.5 % — SIGNIFICANT CHANGE UP (ref 10.3–14.5)
SODIUM SERPL-SCNC: 139 MMOL/L — SIGNIFICANT CHANGE UP (ref 135–145)
TRIGL SERPL-MCNC: 118 MG/DL — SIGNIFICANT CHANGE UP
WBC # BLD: 9.35 K/UL — SIGNIFICANT CHANGE UP (ref 3.8–10.5)
WBC # FLD AUTO: 9.35 K/UL — SIGNIFICANT CHANGE UP (ref 3.8–10.5)

## 2021-04-06 PROCEDURE — 85730 THROMBOPLASTIN TIME PARTIAL: CPT

## 2021-04-06 PROCEDURE — C1769: CPT

## 2021-04-06 PROCEDURE — 93458 L HRT ARTERY/VENTRICLE ANGIO: CPT

## 2021-04-06 PROCEDURE — C1887: CPT

## 2021-04-06 PROCEDURE — 93458 L HRT ARTERY/VENTRICLE ANGIO: CPT | Mod: 26

## 2021-04-06 PROCEDURE — 82550 ASSAY OF CK (CPK): CPT

## 2021-04-06 PROCEDURE — 93010 ELECTROCARDIOGRAM REPORT: CPT

## 2021-04-06 PROCEDURE — 83036 HEMOGLOBIN GLYCOSYLATED A1C: CPT

## 2021-04-06 PROCEDURE — 82553 CREATINE MB FRACTION: CPT

## 2021-04-06 PROCEDURE — 82248 BILIRUBIN DIRECT: CPT

## 2021-04-06 PROCEDURE — 36415 COLL VENOUS BLD VENIPUNCTURE: CPT

## 2021-04-06 PROCEDURE — 80053 COMPREHEN METABOLIC PANEL: CPT

## 2021-04-06 PROCEDURE — 85610 PROTHROMBIN TIME: CPT

## 2021-04-06 PROCEDURE — 85025 COMPLETE CBC W/AUTO DIFF WBC: CPT

## 2021-04-06 PROCEDURE — 93005 ELECTROCARDIOGRAM TRACING: CPT

## 2021-04-06 PROCEDURE — 80061 LIPID PANEL: CPT

## 2021-04-06 PROCEDURE — C1894: CPT

## 2021-04-06 RX ORDER — DEXTROSE 50 % IN WATER 50 %
15 SYRINGE (ML) INTRAVENOUS ONCE
Refills: 0 | Status: DISCONTINUED | OUTPATIENT
Start: 2021-04-06 | End: 2021-04-20

## 2021-04-06 RX ORDER — INSULIN LISPRO 100/ML
VIAL (ML) SUBCUTANEOUS ONCE
Refills: 0 | Status: DISCONTINUED | OUTPATIENT
Start: 2021-04-06 | End: 2021-04-20

## 2021-04-06 RX ORDER — ASPIRIN/CALCIUM CARB/MAGNESIUM 324 MG
81 TABLET ORAL ONCE
Refills: 0 | Status: COMPLETED | OUTPATIENT
Start: 2021-04-06 | End: 2021-04-06

## 2021-04-06 RX ORDER — DEXTROSE 50 % IN WATER 50 %
25 SYRINGE (ML) INTRAVENOUS ONCE
Refills: 0 | Status: DISCONTINUED | OUTPATIENT
Start: 2021-04-06 | End: 2021-04-20

## 2021-04-06 RX ORDER — SODIUM CHLORIDE 9 MG/ML
1000 INJECTION INTRAMUSCULAR; INTRAVENOUS; SUBCUTANEOUS
Refills: 0 | Status: DISCONTINUED | OUTPATIENT
Start: 2021-04-06 | End: 2021-04-06

## 2021-04-06 RX ORDER — DEXTROSE 50 % IN WATER 50 %
12.5 SYRINGE (ML) INTRAVENOUS ONCE
Refills: 0 | Status: DISCONTINUED | OUTPATIENT
Start: 2021-04-06 | End: 2021-04-20

## 2021-04-06 RX ORDER — SODIUM CHLORIDE 9 MG/ML
1000 INJECTION, SOLUTION INTRAVENOUS
Refills: 0 | Status: DISCONTINUED | OUTPATIENT
Start: 2021-04-06 | End: 2021-04-20

## 2021-04-06 RX ORDER — CLOPIDOGREL BISULFATE 75 MG/1
75 TABLET, FILM COATED ORAL ONCE
Refills: 0 | Status: COMPLETED | OUTPATIENT
Start: 2021-04-06 | End: 2021-04-06

## 2021-04-06 RX ORDER — GLUCAGON INJECTION, SOLUTION 0.5 MG/.1ML
1 INJECTION, SOLUTION SUBCUTANEOUS ONCE
Refills: 0 | Status: DISCONTINUED | OUTPATIENT
Start: 2021-04-06 | End: 2021-04-20

## 2021-04-06 RX ADMIN — CLOPIDOGREL BISULFATE 75 MILLIGRAM(S): 75 TABLET, FILM COATED ORAL at 11:10

## 2021-04-06 RX ADMIN — SODIUM CHLORIDE 75 MILLILITER(S): 9 INJECTION INTRAMUSCULAR; INTRAVENOUS; SUBCUTANEOUS at 11:09

## 2021-04-06 RX ADMIN — Medication 81 MILLIGRAM(S): at 11:10

## 2021-04-06 NOTE — PROGRESS NOTE ADULT - SUBJECTIVE AND OBJECTIVE BOX
Interventional Cardiology PA SDA Discharge Note    Patient without complaints. Ambulated and voided without difficulties    Afebrile, VSS    PRESCRIPTIONS/HOME MEDICATIONS:  amLODIPine 2.5 mg oral tablet: 1 tab(s) orally once a day  Aspirin Enteric Coated 81 mg oral delayed release tablet: 1 tab(s) orally once a day  atorvastatin 80 mg oral tablet: 1 tab(s) orally once a day  benztropine 2 mg oral tablet: 1 tab(s) orally once a day (at bedtime)  haloperidol 10 mg oral tablet: 1 tab(s) orally once a day (at bedtime)  Janumet 50 mg-1000 mg oral tablet: 1 tab(s) orally 2 times a day  lisinopril 20 mg oral tablet: 1 tab(s) orally once a day  Plavix 75 mg oral tablet: 1 tab(s) orally once a day  sildenafil 50 mg oral tablet: 1 tab(s) orally once a day        CURRENT MEDICATIONS:   chlorhexidine 4% Liquid 1 Application(s) Topical once  dextrose 40% Gel 15 Gram(s) Oral Once  dextrose 5%. 1000 milliLiter(s) IV Continuous <Continuous>  dextrose 5%. 1000 milliLiter(s) IV Continuous <Continuous>  dextrose 50% Injectable 25 Gram(s) IV Push Once  dextrose 50% Injectable 12.5 Gram(s) IV Push Once  dextrose 50% Injectable 25 Gram(s) IV Push Once  glucagon  Injectable 1 milliGRAM(s) IntraMuscular Once  insulin lispro (ADMELOG) corrective regimen sliding scale   SubCutaneous Once        Ext:  Right    Radial :  no  hematoma or bleeding, dressing C/D/I; radial pulse 2+      A/P:    61 y/o M with PMH of schizophrenia/psychosis, HTN, HLD, DM, CAD s/p PCI mLAD @ [facility] 2017 who presents for left heart cardiac catheterization due to patient's risk factors and abnormal NST. He is now s/p cardiac cath 4/6 revealing non-obstructive CAD (minor luminal irregularities, patent mLAD stent)    -continue medical management  -Follow-up with Cardiologist Dr. Zhou in 1-2 weeks  -Stable for discharge as per attending Dr. Zhou after bed rest, pt voids, wrist stable and 30 minutes of ambulation.  -Discharge forms signed and copies in chart   -Discharge Order Entered

## 2021-04-09 DIAGNOSIS — R07.9 CHEST PAIN, UNSPECIFIED: ICD-10-CM

## 2021-04-09 DIAGNOSIS — Z95.5 PRESENCE OF CORONARY ANGIOPLASTY IMPLANT AND GRAFT: ICD-10-CM

## 2021-04-09 DIAGNOSIS — I20.9 ANGINA PECTORIS, UNSPECIFIED: ICD-10-CM

## 2021-04-09 DIAGNOSIS — Z82.49 FAMILY HISTORY OF ISCHEMIC HEART DISEASE AND OTHER DISEASES OF THE CIRCULATORY SYSTEM: ICD-10-CM

## 2021-04-09 DIAGNOSIS — E78.5 HYPERLIPIDEMIA, UNSPECIFIED: ICD-10-CM

## 2021-04-09 DIAGNOSIS — I10 ESSENTIAL (PRIMARY) HYPERTENSION: ICD-10-CM

## 2022-09-07 ENCOUNTER — EMERGENCY (EMERGENCY)
Facility: HOSPITAL | Age: 62
LOS: 0 days | Discharge: HOME | End: 2022-09-07
Attending: STUDENT IN AN ORGANIZED HEALTH CARE EDUCATION/TRAINING PROGRAM

## 2022-09-07 VITALS
RESPIRATION RATE: 18 BRPM | OXYGEN SATURATION: 96 % | TEMPERATURE: 98 F | HEART RATE: 97 BPM | SYSTOLIC BLOOD PRESSURE: 119 MMHG | DIASTOLIC BLOOD PRESSURE: 81 MMHG

## 2022-09-07 VITALS
RESPIRATION RATE: 16 BRPM | OXYGEN SATURATION: 95 % | SYSTOLIC BLOOD PRESSURE: 125 MMHG | DIASTOLIC BLOOD PRESSURE: 83 MMHG | HEIGHT: 69 IN | TEMPERATURE: 98 F | HEART RATE: 104 BPM | WEIGHT: 149.91 LBS

## 2022-09-07 DIAGNOSIS — F29 UNSPECIFIED PSYCHOSIS NOT DUE TO A SUBSTANCE OR KNOWN PHYSIOLOGICAL CONDITION: ICD-10-CM

## 2022-09-07 DIAGNOSIS — S69.91XA UNSPECIFIED INJURY OF RIGHT WRIST, HAND AND FINGER(S), INITIAL ENCOUNTER: ICD-10-CM
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
## 2022-09-07 DIAGNOSIS — I10 ESSENTIAL (PRIMARY) HYPERTENSION: ICD-10-CM

## 2022-09-07 DIAGNOSIS — S60.031A CONTUSION OF RIGHT MIDDLE FINGER WITHOUT DAMAGE TO NAIL, INITIAL ENCOUNTER: ICD-10-CM

## 2022-09-07 DIAGNOSIS — K27.7 CHRONIC PEPTIC ULCER, SITE UNSPECIFIED, WITHOUT HEMORRHAGE OR PERFORATION: Chronic | ICD-10-CM

## 2022-09-07 PROCEDURE — 99283 EMERGENCY DEPT VISIT LOW MDM: CPT

## 2022-09-07 PROCEDURE — 73130 X-RAY EXAM OF HAND: CPT | Mod: 26,RT

## 2022-09-07 RX ORDER — AMLODIPINE BESYLATE 2.5 MG/1
1 TABLET ORAL
Qty: 0 | Refills: 0 | DISCHARGE

## 2022-09-07 RX ORDER — HALOPERIDOL DECANOATE 100 MG/ML
1 INJECTION INTRAMUSCULAR
Qty: 0 | Refills: 0 | DISCHARGE

## 2022-09-07 RX ORDER — ATORVASTATIN CALCIUM 80 MG/1
1 TABLET, FILM COATED ORAL
Qty: 0 | Refills: 0 | DISCHARGE

## 2022-09-07 RX ORDER — LISINOPRIL 2.5 MG/1
1 TABLET ORAL
Qty: 0 | Refills: 0 | DISCHARGE

## 2022-09-07 RX ORDER — SITAGLIPTIN AND METFORMIN HYDROCHLORIDE 500; 50 MG/1; MG/1
1 TABLET, FILM COATED ORAL
Qty: 0 | Refills: 0 | DISCHARGE

## 2022-09-07 RX ORDER — BENZTROPINE MESYLATE 1 MG
1 TABLET ORAL
Qty: 0 | Refills: 0 | DISCHARGE

## 2022-09-07 NOTE — ED PROVIDER NOTE - PATIENT PORTAL LINK FT
You can access the FollowMyHealth Patient Portal offered by [facility] by registering at the following website: [email or web address]. By joining [employer]’s FollowMyHealth portal, you will also be able to view your health information using other applications (apps) compatible with our system.

## 2022-09-07 NOTE — ED ADULT NURSE NOTE - OBJECTIVE STATEMENT
AAox4 pt c/o discoloration to right 3rd finger x2-3 days. Pt reports closing chest on finger. Pt denies numbness and tingling. Pt denies PMH and taking medications at home

## 2022-09-07 NOTE — ED PROVIDER NOTE - PHYSICAL EXAMINATION
General appearance: Nontoxic appearing, conversant, afebrile    Eyes: anicteric sclerae, RANDI, EOMI   HENT: Atraumatic; oropharynx clear, MMM and no ulcerations, no pharyngeal erythema or exudate   Neck: Trachea midline; Full range of motion, supple   Pulm: normal respiratory effort and no intercostal retractions, normal work of breathing   Extremities: No peripheral edema, no gross deformities, FROM x4, 5/5 MS x4, gross sensation intact, 0.5cm hematoma R third digit distal phalanx palmar surface, no nail involvement   Skin: Dry, normal temperature, turgor and texture; no rash   Psych: Appropriate affect, cooperative
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
## 2022-09-07 NOTE — ED PROVIDER NOTE - MUSCULOSKELETAL, MLM
Spine appears normal, right middle finger active ROM at each joint intact 0.5 cm diameter hematoma on palmar surface of distal phalanx right middle finger.

## 2022-09-22 ENCOUNTER — EMERGENCY (EMERGENCY)
Facility: HOSPITAL | Age: 62
LOS: 0 days | Discharge: HOME | End: 2022-09-22
Attending: EMERGENCY MEDICINE

## 2022-09-22 VITALS
RESPIRATION RATE: 18 BRPM | HEART RATE: 71 BPM | OXYGEN SATURATION: 97 % | DIASTOLIC BLOOD PRESSURE: 93 MMHG | SYSTOLIC BLOOD PRESSURE: 158 MMHG | TEMPERATURE: 98 F

## 2022-09-22 VITALS
RESPIRATION RATE: 19 BRPM | HEIGHT: 69 IN | WEIGHT: 197.09 LBS | OXYGEN SATURATION: 99 % | SYSTOLIC BLOOD PRESSURE: 149 MMHG | HEART RATE: 100 BPM | DIASTOLIC BLOOD PRESSURE: 98 MMHG | TEMPERATURE: 98 F

## 2022-09-22 DIAGNOSIS — Z87.19 PERSONAL HISTORY OF OTHER DISEASES OF THE DIGESTIVE SYSTEM: ICD-10-CM

## 2022-09-22 DIAGNOSIS — K27.7 CHRONIC PEPTIC ULCER, SITE UNSPECIFIED, WITHOUT HEMORRHAGE OR PERFORATION: Chronic | ICD-10-CM

## 2022-09-22 DIAGNOSIS — L02.31 CUTANEOUS ABSCESS OF BUTTOCK: ICD-10-CM

## 2022-09-22 DIAGNOSIS — F29 UNSPECIFIED PSYCHOSIS NOT DUE TO A SUBSTANCE OR KNOWN PHYSIOLOGICAL CONDITION: ICD-10-CM

## 2022-09-22 DIAGNOSIS — I10 ESSENTIAL (PRIMARY) HYPERTENSION: ICD-10-CM

## 2022-09-22 DIAGNOSIS — M79.18 MYALGIA, OTHER SITE: ICD-10-CM

## 2022-09-22 PROCEDURE — 10060 I&D ABSCESS SIMPLE/SINGLE: CPT

## 2022-09-22 PROCEDURE — 99283 EMERGENCY DEPT VISIT LOW MDM: CPT | Mod: 25

## 2022-09-22 RX ORDER — IBUPROFEN 200 MG
600 TABLET ORAL ONCE
Refills: 0 | Status: COMPLETED | OUTPATIENT
Start: 2022-09-22 | End: 2022-09-22

## 2022-09-22 RX ORDER — IBUPROFEN 200 MG
1 TABLET ORAL
Qty: 15 | Refills: 0
Start: 2022-09-22 | End: 2022-09-26

## 2022-09-22 RX ADMIN — Medication 600 MILLIGRAM(S): at 10:00

## 2022-09-22 RX ADMIN — Medication 1 TABLET(S): at 10:00

## 2022-09-22 RX ADMIN — Medication 600 MILLIGRAM(S): at 10:10

## 2022-09-22 NOTE — ED PROVIDER NOTE - PATIENT PORTAL LINK FT
You can access the FollowMyHealth Patient Portal offered by [facility] by registering at the following website: [email or web address]. By joining [employer]’s FollowMyHealth portal, you will also be able to view your health information using other applications (apps) compatible with our system.

## 2022-09-22 NOTE — ED PROVIDER NOTE - MUSCULOSKELETAL NEGATIVE STATEMENT, MLM
Follow up with oncology in about a year  Follow up with your primary care physician  
no back pain, no gout, no musculoskeletal pain, no neck pain, and no weakness.

## 2022-09-22 NOTE — ED PROVIDER NOTE - OBJECTIVE STATEMENT
61 years old male walked inc/o swelling bump pain to the right buttock x 1 and 1/2 weeks. Pt denies recent hx of trauma hx of diabetes headache, dizziness, neck/back pain, cough, sob, chest pain, nausea, vomiting, fever, chills, abd pain, dysuria or irregular bowel movements. Pt also denies harmful thoughts to himself or other, or visual/auditory hallucination.

## 2022-09-22 NOTE — ED PROVIDER NOTE - PROGRESS NOTE DETAILS
Pt is alert and oriented x 3 smiling pleasant very comfortable. pt is not septic no hx of diabetes no hx of immunocompromised disease.

## 2022-09-22 NOTE — ED ADULT NURSE NOTE - OBJECTIVE STATEMENT
Pt presents to ed c/o "bump" to his right buttocks. Pt denies fever/chills, redness, n/v/d, dizziness, cp, sob, headache, abdominal pain, numbness/tingling, weakness. Pt has no other complaints. Respirations even and unlabored. No acute distress noted.

## 2023-01-30 NOTE — ED PROVIDER NOTE - CARDIAC, MLM
Normal rate, regular rhythm.  Heart sounds S1, S2.  No murmurs, rubs or gallops. Gabapentin Counseling: I discussed with the patient the risks of gabapentin including but not limited to dizziness, somnolence, fatigue and ataxia.

## 2023-02-16 NOTE — ED PROVIDER NOTE - DISPOSITION TYPE
General Sunscreen Counseling: I recommended a broad spectrum sunscreen with maximum SPF.  Sunscreens should be applied at least 15 minutes prior to expected sun exposure and then every 2 hours after that as long as sun exposure continues. If swimming or exercising sunscreen should be reapplied every 45 minutes to an hour after getting wet or sweating. I also recommended a lip balm with a sunscreen as well. Sun protective clothing can be used in lieu of sunscreen but must be worn the entire time you are exposed to the sun's rays. Products Recommended: Neutrogena, Elta MD, CeraVe, Cetaphil Detail Level: Generalized DISCHARGE

## 2024-02-12 NOTE — ED PROVIDER NOTE - NSFOLLOWUPINSTRUCTIONS_ED_ALL_ED_FT
eyeglasses
Rest, drink plenty of fluids  Advance activity as tolerated  Continue all previously prescribed medications as directed  Follow up with your PMD - bring copies of your results  Return to the ER for severe pain, swelling, redness, infection, or other new or concerning symptoms

## 2024-06-17 ENCOUNTER — EMERGENCY (EMERGENCY)
Facility: HOSPITAL | Age: 64
LOS: 0 days | Discharge: HOME | End: 2024-06-17
Attending: EMERGENCY MEDICINE
Payer: COMMERCIAL

## 2024-06-17 VITALS
TEMPERATURE: 99 F | OXYGEN SATURATION: 98 % | DIASTOLIC BLOOD PRESSURE: 94 MMHG | RESPIRATION RATE: 18 BRPM | HEART RATE: 108 BPM | SYSTOLIC BLOOD PRESSURE: 151 MMHG | WEIGHT: 177.91 LBS

## 2024-06-17 VITALS
TEMPERATURE: 100 F | HEART RATE: 103 BPM | SYSTOLIC BLOOD PRESSURE: 134 MMHG | RESPIRATION RATE: 18 BRPM | DIASTOLIC BLOOD PRESSURE: 91 MMHG | OXYGEN SATURATION: 95 %

## 2024-06-17 DIAGNOSIS — L03.317 CELLULITIS OF BUTTOCK: ICD-10-CM

## 2024-06-17 DIAGNOSIS — M79.18 MYALGIA, OTHER SITE: ICD-10-CM

## 2024-06-17 DIAGNOSIS — R21 RASH AND OTHER NONSPECIFIC SKIN ERUPTION: ICD-10-CM

## 2024-06-17 DIAGNOSIS — K27.7 CHRONIC PEPTIC ULCER, SITE UNSPECIFIED, WITHOUT HEMORRHAGE OR PERFORATION: Chronic | ICD-10-CM

## 2024-06-17 PROCEDURE — 99284 EMERGENCY DEPT VISIT MOD MDM: CPT

## 2024-06-17 RX ORDER — ACETAMINOPHEN 500 MG
975 TABLET ORAL ONCE
Refills: 0 | Status: COMPLETED | OUTPATIENT
Start: 2024-06-17 | End: 2024-06-17

## 2024-06-17 RX ADMIN — Medication 975 MILLIGRAM(S): at 13:30

## 2024-06-17 RX ADMIN — Medication 100 MILLIGRAM(S): at 12:53

## 2024-06-17 NOTE — ED ADULT NURSE REASSESSMENT NOTE - NS ED NURSE REASSESS COMMENT FT1
Pt needs antibiotics but unable to afford medication from pharmacy. Pt states he can't pick it up until the 1st of July. Called SW to speak with patient.

## 2024-06-17 NOTE — ED ADULT NURSE NOTE - NS ED NOTE ABUSE SUSPICION NEGLECT YN
DX:  DVT  Goal: 2.0 -3.0  Spoke with patient via phone for follow up anticoagulation visit.   Last INR on 5/17/2021 was 3.4.  Dose decreased.   Today's INR is 3.3 and is above goal range.    Current warfarin total weekly dose of 25.5 mg verified.  Informed the INR result is above therapeutic range and instructed to decrease current dose per protocol. Discussed dose and return date of 6/14/2021 for next INR. See Anticoagulation flowsheet.    Dr. Nascimento is in the office today supervising the treatment. Note forwarded for review.    Call your physician or seek medical care immediately if you notice any of the following symptoms of a bleed:   · Red, dark, coffee or cola colored urine  · Red or tar like stools  · Excessive bleeding from gums or nose  · Vomiting coffee colored or bright red material  · Coughing up red tinged sputum  · Severe or unprovoked pain (ex: severe Headache or Abdominal pain)  · Sudden, spontaneous bruising for no reason  · For female patients:  Excessive menstrual bleeding  · A cut that will not stop bleeding within 10-15 mins  · Symptoms associated with abnormal bleeding/high INR reviewed.    Encouraged to avoid activities that may result in a serious fall or injury and verbalizes understanding.       No

## 2024-06-17 NOTE — CHART NOTE - NSCHARTNOTEFT_GEN_A_CORE
SW met with pt due to concerns that pt would not be able to afford antibiotics that pt is scheduled SW met with pt due to concerns that pt would not be able to afford antibiotics that will be sent to pt's pharmacy upon d/c. Pt reports that he is on a fixed income and won't be receiving the money he will need for the medications until July 1st. Pt reports that he does have some social support in the community and that he likely can outreach to some of his friends to borrow money if he needs to in order to afford his medications. SW signed pt up for "[facility]" and explained that he should be receiving an ID card in the mail that he can present to the pharmacy in the future to get discounts on copays for prescriptions. Pt reports that upon d/c that he will feel comfortable taking public transportation home. Pt reports using the bus system regularly and that he would be able to find his way home.

## 2024-06-17 NOTE — ED ADULT NURSE NOTE - CAS EDN DISCHARGE ASSESSMENT
Pt A&Ox4 ambulated with equal steady gait in no acute distress./Alert and oriented to person, place and time/Patient baseline mental status/Awake

## 2024-06-17 NOTE — ED ADULT NURSE NOTE - CHIEF COMPLAINT QUOTE
pt c/o abscess on right buttock for 3 day. c/o hardness at the site. denies fever or chills. no history.

## 2024-06-17 NOTE — ED ADULT NURSE NOTE - OBJECTIVE STATEMENT
63 yr old with no PMH. Pt comes in with c/o "hard substance" on his right buttocks starting 3 days ago. Pt denies chest pain, SOB, dizziness, blurred vision, N/V/D. Pt denies any recent falls/trauma.

## 2024-06-17 NOTE — ED PROVIDER NOTE - OBJECTIVE STATEMENT
The patient reports gradually increasing pain in the right butt cheek over the last few days and he noted the area has become firm. No fevers no trouble defecating.

## 2024-06-17 NOTE — ED PROVIDER NOTE - PATIENT PORTAL LINK FT
You can access the FollowMyHealth Patient Portal offered by [facility] by registering at the following website: [email or web address]. By joining [employer]’s FollowMyHealth portal, you will also be able to view your health information using other applications (apps) compatible with our system.

## 2024-06-17 NOTE — ED ADULT TRIAGE NOTE - CHIEF COMPLAINT QUOTE
pt c/o abscess on right buttock for 3 day. c/o hardness at the site. no history. pt c/o abscess on right buttock for 3 day. c/o hardness at the site. denies fever or chills. no history.

## 2024-06-17 NOTE — ED PROVIDER NOTE - SKIN, MLM
Skin normal color for race, warm, dry and intact. On the medial lower right butt cheek there is an oval shaped area of erythema warmth and tenderness but no fluctuance and there is no hypoechoic area on ultrasound of the area. It does not extend to the scrotum nor to the anal opening.

## 2024-06-17 NOTE — ED PROVIDER NOTE - NSFOLLOWUPINSTRUCTIONS_ED_ALL_ED_FT
You have cellulitis, a skin infection. Take doxycycline 100mg every 12 hours for 5 days. Return to the ER if you pass out, if the rash spreads or any other concerning symptoms.

## 2024-09-25 ENCOUNTER — EMERGENCY (EMERGENCY)
Facility: HOSPITAL | Age: 64
LOS: 0 days | Discharge: HOME | End: 2024-09-25
Attending: STUDENT IN AN ORGANIZED HEALTH CARE EDUCATION/TRAINING PROGRAM
Payer: MEDICARE

## 2024-09-25 VITALS
HEART RATE: 98 BPM | RESPIRATION RATE: 20 BRPM | OXYGEN SATURATION: 96 % | TEMPERATURE: 98 F | SYSTOLIC BLOOD PRESSURE: 115 MMHG | WEIGHT: 134.92 LBS | HEIGHT: 70 IN | DIASTOLIC BLOOD PRESSURE: 78 MMHG

## 2024-09-25 VITALS
HEART RATE: 72 BPM | RESPIRATION RATE: 18 BRPM | OXYGEN SATURATION: 94 % | TEMPERATURE: 98 F | SYSTOLIC BLOOD PRESSURE: 120 MMHG | DIASTOLIC BLOOD PRESSURE: 79 MMHG

## 2024-09-25 DIAGNOSIS — K27.7 CHRONIC PEPTIC ULCER, SITE UNSPECIFIED, WITHOUT HEMORRHAGE OR PERFORATION: Chronic | ICD-10-CM

## 2024-09-25 LAB
ALBUMIN SERPL ELPH-MCNC: 3.7 G/DL — SIGNIFICANT CHANGE UP (ref 3.3–5)
ALP SERPL-CCNC: 107 U/L — SIGNIFICANT CHANGE UP (ref 40–120)
ALT FLD-CCNC: 37 U/L — SIGNIFICANT CHANGE UP (ref 12–78)
ANION GAP SERPL CALC-SCNC: 7 MMOL/L — SIGNIFICANT CHANGE UP (ref 5–17)
APPEARANCE UR: CLEAR — SIGNIFICANT CHANGE UP
AST SERPL-CCNC: 13 U/L — LOW (ref 15–37)
BACTERIA # UR AUTO: ABNORMAL /HPF
BASOPHILS # BLD AUTO: 0.04 K/UL — SIGNIFICANT CHANGE UP (ref 0–0.2)
BASOPHILS NFR BLD AUTO: 0.4 % — SIGNIFICANT CHANGE UP (ref 0–2)
BILIRUB SERPL-MCNC: 0.5 MG/DL — SIGNIFICANT CHANGE UP (ref 0.2–1.2)
BILIRUB UR-MCNC: NEGATIVE — SIGNIFICANT CHANGE UP
BUN SERPL-MCNC: 18 MG/DL — SIGNIFICANT CHANGE UP (ref 7–23)
CALCIUM SERPL-MCNC: 8.9 MG/DL — SIGNIFICANT CHANGE UP (ref 8.5–10.1)
CHLORIDE SERPL-SCNC: 104 MMOL/L — SIGNIFICANT CHANGE UP (ref 96–108)
CO2 SERPL-SCNC: 27 MMOL/L — SIGNIFICANT CHANGE UP (ref 22–31)
COLOR SPEC: YELLOW — SIGNIFICANT CHANGE UP
CREAT SERPL-MCNC: 1.12 MG/DL — SIGNIFICANT CHANGE UP (ref 0.5–1.3)
DIFF PNL FLD: ABNORMAL
EGFR: 73 ML/MIN/1.73M2 — SIGNIFICANT CHANGE UP
EOSINOPHIL # BLD AUTO: 0.26 K/UL — SIGNIFICANT CHANGE UP (ref 0–0.5)
EOSINOPHIL NFR BLD AUTO: 2.4 % — SIGNIFICANT CHANGE UP (ref 0–6)
EPI CELLS # UR: PRESENT
GLUCOSE SERPL-MCNC: 279 MG/DL — HIGH (ref 70–99)
GLUCOSE UR QL: >=1000 MG/DL
HCT VFR BLD CALC: 40.5 % — SIGNIFICANT CHANGE UP (ref 39–50)
HGB BLD-MCNC: 14.6 G/DL — SIGNIFICANT CHANGE UP (ref 13–17)
HIV 1 & 2 AB SERPL IA.RAPID: SIGNIFICANT CHANGE UP
IMM GRANULOCYTES NFR BLD AUTO: 0.2 % — SIGNIFICANT CHANGE UP (ref 0–0.9)
KETONES UR-MCNC: ABNORMAL MG/DL
LEUKOCYTE ESTERASE UR-ACNC: NEGATIVE — SIGNIFICANT CHANGE UP
LYMPHOCYTES # BLD AUTO: 2.66 K/UL — SIGNIFICANT CHANGE UP (ref 1–3.3)
LYMPHOCYTES # BLD AUTO: 24.3 % — SIGNIFICANT CHANGE UP (ref 13–44)
MCHC RBC-ENTMCNC: 31.9 PG — SIGNIFICANT CHANGE UP (ref 27–34)
MCHC RBC-ENTMCNC: 36 G/DL — SIGNIFICANT CHANGE UP (ref 32–36)
MCV RBC AUTO: 88.6 FL — SIGNIFICANT CHANGE UP (ref 80–100)
MONOCYTES # BLD AUTO: 0.77 K/UL — SIGNIFICANT CHANGE UP (ref 0–0.9)
MONOCYTES NFR BLD AUTO: 7 % — SIGNIFICANT CHANGE UP (ref 2–14)
NEUTROPHILS # BLD AUTO: 7.18 K/UL — SIGNIFICANT CHANGE UP (ref 1.8–7.4)
NEUTROPHILS NFR BLD AUTO: 65.7 % — SIGNIFICANT CHANGE UP (ref 43–77)
NITRITE UR-MCNC: NEGATIVE — SIGNIFICANT CHANGE UP
NRBC # BLD: 0 /100 WBCS — SIGNIFICANT CHANGE UP (ref 0–0)
PH UR: 5.5 — SIGNIFICANT CHANGE UP (ref 5–8)
PLATELET # BLD AUTO: 241 K/UL — SIGNIFICANT CHANGE UP (ref 150–400)
POTASSIUM SERPL-MCNC: 3.6 MMOL/L — SIGNIFICANT CHANGE UP (ref 3.5–5.3)
POTASSIUM SERPL-SCNC: 3.6 MMOL/L — SIGNIFICANT CHANGE UP (ref 3.5–5.3)
PROT SERPL-MCNC: 7.1 GM/DL — SIGNIFICANT CHANGE UP (ref 6–8.3)
PROT UR-MCNC: SIGNIFICANT CHANGE UP MG/DL
RBC # BLD: 4.57 M/UL — SIGNIFICANT CHANGE UP (ref 4.2–5.8)
RBC # FLD: 12.4 % — SIGNIFICANT CHANGE UP (ref 10.3–14.5)
RBC CASTS # UR COMP ASSIST: 5 /HPF — HIGH (ref 0–4)
SODIUM SERPL-SCNC: 138 MMOL/L — SIGNIFICANT CHANGE UP (ref 135–145)
SP GR SPEC: >1.03 — HIGH (ref 1–1.03)
UROBILINOGEN FLD QL: 1 MG/DL — SIGNIFICANT CHANGE UP (ref 0.2–1)
WBC # BLD: 10.93 K/UL — HIGH (ref 3.8–10.5)
WBC # FLD AUTO: 10.93 K/UL — HIGH (ref 3.8–10.5)
WBC UR QL: 3 /HPF — SIGNIFICANT CHANGE UP (ref 0–5)

## 2024-09-25 PROCEDURE — 99285 EMERGENCY DEPT VISIT HI MDM: CPT | Mod: FS

## 2024-09-25 PROCEDURE — 99222 1ST HOSP IP/OBS MODERATE 55: CPT | Mod: FS

## 2024-09-25 RX ORDER — MICONAZOLE NITRATE 20 MG/G
1 OINTMENT TOPICAL
Qty: 1 | Refills: 0
Start: 2024-09-25 | End: 2024-10-08

## 2024-09-25 RX ORDER — SITAGLIPTIN AND METFORMIN HYDROCHLORIDE 500; 50 MG/1; MG/1
2 TABLET, FILM COATED ORAL
Refills: 0 | DISCHARGE

## 2024-09-25 RX ORDER — LISINOPRIL 10 MG/1
1 TABLET ORAL
Refills: 0 | DISCHARGE

## 2024-09-25 RX ORDER — HALOPERIDOL 1 MG
1 TABLET ORAL
Refills: 0 | DISCHARGE

## 2024-09-25 RX ORDER — ASPIRIN 81 MG
1 TABLET, DELAYED RELEASE (ENTERIC COATED) ORAL
Refills: 0 | DISCHARGE

## 2024-09-25 RX ORDER — AMLODIPINE BESYLATE 10 MG/1
1 TABLET ORAL
Refills: 0 | DISCHARGE

## 2024-09-25 RX ORDER — SILDENAFIL 25 MG/1
1 TABLET, FILM COATED ORAL
Refills: 0 | DISCHARGE

## 2024-09-25 RX ORDER — BENZTROPINE MESYLATE 2 MG/1
1 TABLET ORAL
Refills: 0 | DISCHARGE

## 2024-09-25 NOTE — ED PROVIDER NOTE - PENIS
circumferential swelling to the glans penis with erythema, excoriation and white discharge note, unclear if patient has circumcision or foreskin is retracted/stuck and swollen

## 2024-09-25 NOTE — CONSULT NOTE ADULT - SUBJECTIVE AND OBJECTIVE BOX
UROLOGY PA CONSULT NOTE:    CHIEF COMPLAINT:  Patient is a 63y old  Male who presents with a chief complaint of penile pain.      HPI:  Patient is 63 year old male with PMHx hypertension, hyperlipidemia, CAD with PCI in 2017, schizophrenia/psychosis presenting to [facility] w/ penile pain.  Patient unable to illicit history of recent events, he is a poor historian.  Patient only able to endorse he is unable to retract foreskin of penis which began earlier today.  Denies fevers, chills, chest pain, SOB, palpitations, calf pain.      PAST MEDICAL HISTORY:  PAST MEDICAL & SURGICAL HISTORY:  Peptic ulcer      Hypertension      CAD (coronary artery disease)      Schizophrenia      HLD (hyperlipidemia)      No significant past surgical history          PAST SURGICAL HISTORY:    REVIEW OF SYSTEMS:  General/Constitutional: No acute distress, no headache, weakness, fevers, or chills   HEENT: Denies auditory or visual changes/disturbances, no vertigo, no throat pain, no dysphagia    Neck: Denies neck pain/stiffness, denies swelling/lumps/hoarseness   Lymphatic: Denies lumps or swelling in the axillae, groin, or neck bilaterally   Respiratory: Denies cough/hemoptysis, denies wheezing/SOB/dyspnea  Cardiac: Denies chest pain, palpitations  Abdomen: Denies abdominal bloating/fullness, nausea or vomiting, denies abdominal pain  Extremities: Denies sores, swelling, discoloration bilat UE/LE  Genitourinary: unable to retract foreskin   Neuro: Denies weakness, paraesthesias, paralysis, syncope, loss of vision  Skin: Denies pruritus, pain, rashes  Psych: Denies hallucinations, visual disturbances, or depression    MEDICATIONS:  Home Medications:  amLODIPine 2.5 mg oral tablet: 1 tab(s) orally once a day (25 Sep 2024 17:34)  aspirin 81 mg oral capsule: 1 cap(s) orally once a day (25 Sep 2024 17:34)  atorvastatin 80 mg oral tablet: 1 tab(s) orally once a day (at bedtime) (25 Sep 2024 17:34)  benztropine 2 mg oral tablet: 1 tab(s) orally once a day (at bedtime) (25 Sep 2024 17:34)  Haldol 10 mg oral tablet: 1 tab(s) orally once a day (at bedtime) (25 Sep 2024 17:34)  Janumet XR 50 mg-1000 mg oral tablet, extended release: 2 tab(s) orally once a day (25 Sep 2024 17:34)  lisinopril 20 mg oral tablet: 1 tab(s) orally once a day (25 Sep 2024 17:34)  Plavix 75 mg oral tablet: 1 tab(s) orally once a day (25 Sep 2024 17:34)  sildenafil 50 mg oral tablet: 1 tab(s) orally once a day (25 Sep 2024 17:34)    MEDICATIONS  (STANDING):    MEDICATIONS  (PRN):      ALLERGIES:  Allergies    No Known Allergies    Intolerances            FAMILY HISTORY:  FAMILY HISTORY:      VITAL SIGNS:  Vital Signs Last 24 Hrs  T(C): 36.8 (25 Sep 2024 16:33), Max: 36.8 (25 Sep 2024 16:33)  T(F): 98.2 (25 Sep 2024 16:33), Max: 98.2 (25 Sep 2024 16:33)  HR: 98 (25 Sep 2024 16:33) (98 - 98)  BP: 115/78 (25 Sep 2024 16:33) (115/78 - 115/78)  BP(mean): --  RR: 20 (25 Sep 2024 16:33) (20 - 20)  SpO2: 96% (25 Sep 2024 16:33) (96% - 96%)    Parameters below as of 25 Sep 2024 16:33  Patient On (Oxygen Delivery Method): room air        PHYSICAL EXAM:  General: lying in bed, at rest,   Head, Eyes, Ears, Nose, Throat: Normal cephalic/atraumatic, anicteric, conjunctiva-non injected and moist, vision grossly intact, hearing grossly intact, no nasal discharge, ears and nose symmetrical and atraumatic.  Nasal, oral, and oropharyngeal mucosa pink moist with no evidence of ulceration  Neck: Supple, carotids have good upstroke, trachea in the midline, without JVD or thyromegaly  Lymphatic: No evidence of masses or lymphadenopathy in the head, neck, trunk, axillary, inguinal, or supraclavicular regions  Chest: Lungs are clear to P&A, no wheezing, no rales, no ronchi, with good inspiratory effort  Heart: Heart rhythm regular, no murmurs  Abdomen: Soft, non tender, good bowel sounds present in all four quadrants.    : penile erect, swollen meatus, foreskin over glans unable to be retracted.   Extremity: No swelling, or open sores, no gross deformities,  good range of motion, 2+ peripheral pulses bilat UE/LE, no edema,  negative Hector's sign, no lymphadenopathy      LABS:                        14.6   10.93 )-----------( 241      ( 25 Sep 2024 18:00 )             40.5     09-25    138  |  104  |  18  ----------------------------<  279[H]  3.6   |  27  |  1.12    Ca    8.9      25 Sep 2024 18:00    TPro  7.1  /  Alb  3.7  /  TBili  0.5  /  DBili  x   /  AST  13[L]  /  ALT  37  /  AlkPhos  107  09-25      Urinalysis Basic - ( 25 Sep 2024 18:00 )    Color: x / Appearance: x / SG: x / pH: x  Gluc: 279 mg/dL / Ketone: x  / Bili: x / Urobili: x   Blood: x / Protein: x / Nitrite: x   Leuk Esterase: x / RBC: x / WBC x   Sq Epi: x / Non Sq Epi: x / Bacteria: x      LIVER FUNCTIONS - ( 25 Sep 2024 18:00 )  Alb: 3.7 g/dL / Pro: 7.1 gm/dL / ALK PHOS: 107 U/L / ALT: 37 U/L / AST: 13 U/L / GGT: x               RADIOLOGY & ADDITIONAL STUDIES:    ASSESSMENT:  Patient is 63 year old male with PMHx hypertension, hyperlipidemia, CAD with PCI in 2017, schizophrenia/psychosis, ?recent circumcision presenting to [facility] w/ penile pain, found to have paraphimosis now s/p reduction.    Patient clinically improved following reduction.      PLAN:  - recommend Antibiotics, Rocephin  - Patient to follow up with Dr. Mccarthy for revision of circumcision  - Patient stable for discharge from urologic perspective  - discussed w/ Dr. Mccarthy

## 2024-09-25 NOTE — ED ADULT NURSE NOTE - OBJECTIVE STATEMENT
64 yo male presents to ED c/o prolonged erection    Pmhx schizophrenia, HTN, HLD, DM, CAD s/p PCI (2017 ). 62 yo male presents to ED c/o prolonged erection "for over 24 hours" from taking "impotence medications". Pt unable to provide any further information. Hx and med list obtained from previous charts. Pmhx schizophrenia, HTN, HLD, DM, CAD s/p PCI (2017). Ice pack placed to groin area.

## 2024-09-25 NOTE — ED PROVIDER NOTE - OBJECTIVE STATEMENT
63-year-old male with no reported past medical history, per chart review history of hypertension, hyperlipidemia, CAD with PCI in 2017, schizophrenia/psychosis presents emergency room with penile pain.  Patient reports inability to retract foreskin, history very limited.  Denies fever, chills, abdominal pain, nausea, vomiting, diarrhea, urinary complaints.  Patient states he has not been sexually active for many years, denies history of STDs in the past.  Denies smoking, drinking, drug use. Unclear surgical history.

## 2024-09-25 NOTE — ED PROVIDER NOTE - NSFOLLOWUPINSTRUCTIONS_ED_ALL_ED_FT
Today you were seen in the ER for paraphimosis and found to have balanitis and high blood sugar.     Please see below for a copy of your results.     A prescription for Clotrimazole cream was sent to the pharmacy. Read all instructions and take as directed.     Follow up with urology for evaluation of circumcision and primary care for elevated sugars.     Paraphimosis  Paraphimosis is a serious condition that happens when the fold of skin that stretches over the tip of the penis (foreskin) becomes stuck when it is pulled back. This condition blocks blood from flowing away from the penis tip, and that causes swelling that gets worse and worse. Paraphimosis needs to be treated right away.    What are the causes?  This condition may be caused by:  Leaving the foreskin pulled back after a procedure, such as after the placement of a catheter.  A foreskin that is tighter than normal.  A foreskin that has been pulled back for too long.  A forceful pulling back of the foreskin.  Infection under the foreskin.  Trauma to the area, such as a hard hit to the tip of the penis.  Having sex or masturbating.  Hair or clothing that gets wrapped around the penis.  What increases the risk?  [id number] images showing an uncircumcised penis and a circumcised penis. Dotted line shows where the foreskin is removed.  You are more likely to develop this condition if:  You have not had all of your foreskin removed.  You have had frequent procedures or surgeries on your urinary or reproductive organs.  You have poor hygiene.  You need help with daily hygiene from a caregiver.  What are the signs or symptoms?  Symptoms of this condition include:  A foreskin that cannot be returned to its normal position.  Pain, often at the tip of the penis.  Swelling of the penis.  Trouble urinating.  Skin that is red or bluish at the tip of the penis.  How is this diagnosed?  This condition may be diagnosed based on your symptoms and a physical exam.    How is this treated?  This condition may be treated with:  A procedure in which your health care provider manually moves the foreskin back into position over the tip of the penis. Swelling may first be reduced by:  Applying ice to the area.  Wrapping a bandage tightly around the penis.  Using needles to drain any fluid, pus, or blood that is causing the swelling.  Applying a gauze soaked with medicines or solutions.  Applying granulated sugar to the area.  Injecting medicine into the foreskin to reduce swelling.  Medicine to relieve pain. Medicine may be given by mouth, through an IV, or by an injection to the base of the penis (nerve block).  A procedure in which a small incision is made in the tightened foreskin to free it and allow it to be pulled back into place (dorsal slit procedure).  Surgery to remove the foreskin (circumcision). This may be done if the foreskin cannot be moved back into place.  Most of the time, treatment can be done in a clinic or a health care provider's office.    Follow these instructions at home:  Lifestyle    Follow instructions from your health care provider about avoiding sexual activity.  Wear loose undergarments to avoid applying pressure to the area.  General instructions    Take over-the-counter and prescription medicines only as told by your health care provider.  Apply any creams to the affected area only as told by your health care provider.  Follow instructions from your health care provider about how to take care of your wound. Make sure you:  Wash your hands with soap and water for at least 20 seconds before and after you change your bandage (dressing) if you were given one. If soap and water are not available, use hand .  Ask when you should remove your dressing.  Ask whether the area can get wet.  Do not retract the foreskin for 1 week or as told by your health care provider.  Keep all follow-up visits. This is important.    Contact a health care provider if:  The treated area of skin does not heal or it becomes more irritated, red, or bloody.  Urination is difficult or painful.  Pain in the penis continues, even after you take medicine for pain.  You develop a fever.  Get help right away if:  The skin at the tip of the penis is red or bluish.  You have severe pain.    Summary  Paraphimosis is a serious condition that happens when the fold of skin that stretches over the tip of the penis (foreskin) becomes stuck when it is pulled back. Paraphimosis needs to be treated right away.  Take over-the-counter and prescription medicines only as told by your health care provider.  Apply any creams to the affected area only as told by your health care provider.  Contact a health care provider if urination is difficult or painful, or if pain in the penis continues even after you take medicine for pain.  Keep all follow-up visits. This is important.    Hyperglycemia    Hyperglycemia occurs when the glucose (sugar) level in your blood is too high. Symptoms include increased urination, increased thirst, a dry mouth, or changes in appetite. If started on a medication, take exactly as prescribed by your health care professional. Maintain a healthy lifestyle and follow up with your primary care physician.    SEEK IMMEDIATE MEDICAL CARE IF YOU HAVE ANY OF THE FOLLOWING SYMPTOMS: shortness of breath, change in mental status, nausea or vomiting, fruity smell to your breath, or any signs of dehydration.    Advance activity as tolerated.      Continue all previously prescribed medications as directed unless otherwise instructed.      Follow up with your primary care physician in 48-72 hours- bring copies of your results.

## 2024-09-25 NOTE — ED PROVIDER NOTE - CLINICAL SUMMARY MEDICAL DECISION MAKING FREE TEXT BOX
63-year-old male with no reported past medical history, per chart review history of hypertension, hyperlipidemia, CAD with PCI in 2017, schizophrenia/psychosis presents emergency room with penile pain.  Patient reports inability to retract foreskin for unknown period of time, history very limited.  Denies fever, chills, abdominal pain, nausea, vomiting, diarrhea, urinary complaints. Not sexually active, denies history of STDs in the past.  Denies smoking, drinking, drug use. Unclear surgical history. Vital signs stable abdomen soft nontender, nondistended, testicles normal appearing/lying bilaterally, circumferential swelling to the glans penis with erythema, excoriation and white discharge note, unclear if patient has circumcision or foreskin is retracted/stuck and swollen. Ice pack placed, urology to see patient, pending blood work - concern for balanitis, lower suspicion for paraphimosis, will r/o UTI vs HIV vs DM, send testing for STDs.

## 2024-09-25 NOTE — ED ADULT NURSE NOTE - NSICDXPASTMEDICALHX_GEN_ALL_CORE_FT
PAST MEDICAL HISTORY:  CAD (coronary artery disease)     HLD (hyperlipidemia)     Hypertension     Peptic ulcer     Schizophrenia

## 2024-09-25 NOTE — ED PROVIDER NOTE - CARE PROVIDER_API CALL
Brian Mccarthy  Urology  [address]  [place] [id number]  Phone: [phone]  Fax: [phone]  Follow Up Time:

## 2024-09-25 NOTE — ED PROVIDER NOTE - PROGRESS NOTE DETAILS
KD Ho: Paraphimosis reduced by urology Dr. Mccarthy, recommending evaluation for circumcision given stenotic skin, no additional management on their end at this time.  Blood work overall unremarkable other than glucose being elevated, urine without evidence of UTI but showing glucose.  HIV negative, pending remaining STD results however given low suspicion, will defer treatment at this time.  Will DC with PCP follow-up and further management/evaluation for diabetes.  Patient aware of the plan and has no questions at this time, will DC with urology follow-up and clotrimazole cream for balanitis.

## 2024-09-25 NOTE — ED PROVIDER NOTE - PATIENT PORTAL LINK FT
You can access the FollowMyHealth Patient Portal offered by [facility] by registering at the following website: [email or web address]. By joining [employer]’s FollowMyHealth portal, you will also be able to view your health information using other applications (apps) compatible with our system.

## 2024-09-25 NOTE — ED ADULT NURSE NOTE - NSFALLRISKINTERV_ED_ALL_ED
Assistance with ambulation/Communicate fall risk and risk factors to all staff, patient, and family/Provide patient with walking aids/Provide visual cue: yellow wristband, yellow gown, etc/Reinforce activity limits and safety measures with patient and family/Call bell, personal items and telephone in reach/Instruct patient to call for assistance before getting out of bed/chair/stretcher/Non-slip footwear applied when patient is off stretcher/[place] to call system/Physically safe environment - no spills, clutter or unnecessary equipment/Purposeful Proactive Rounding/Room/bathroom lighting operational, light cord in reach

## 2024-09-25 NOTE — ED PROVIDER NOTE - ATTENDING APP SHARED VISIT CONTRIBUTION OF CARE
62 y/o M with PMH HTN, HLD, CAD, schizophrenia, here w/ penile pain. Patient states was in shower and unable to reduce his foreskin back over glans. Denies hx of sexual activity. Unclear if patient has been circumcised. He is poor historian.  In the ED, vitals stable.  The foreskin appears retracted and there appears to be ulceration around the glans.   Suspect balanitis.  Unable to reduce foreskin on exam. Urology consulted and able to reduce at bedside.  Will plan to treat for balanitis. STD and syphilis testing sent.  Urology f/u.

## 2024-09-26 LAB
C TRACH RRNA SPEC QL NAA+PROBE: SIGNIFICANT CHANGE UP
CULTURE RESULTS: SIGNIFICANT CHANGE UP
HAV IGM SER-ACNC: SIGNIFICANT CHANGE UP
HBV CORE IGM SER-ACNC: SIGNIFICANT CHANGE UP
HCV AB S/CO SERPL IA: 0.24 S/CO — SIGNIFICANT CHANGE UP (ref 0–0.99)
HCV AB SERPL-IMP: SIGNIFICANT CHANGE UP
N GONORRHOEA RRNA SPEC QL NAA+PROBE: SIGNIFICANT CHANGE UP
SPECIMEN SOURCE: SIGNIFICANT CHANGE UP
SPECIMEN SOURCE: SIGNIFICANT CHANGE UP
T PALLIDUM AB TITR SER: NEGATIVE — SIGNIFICANT CHANGE UP

## 2024-09-27 LAB — HBV SURFACE AG SER-ACNC: REACTIVE

## 2024-10-12 ENCOUNTER — EMERGENCY (EMERGENCY)
Facility: HOSPITAL | Age: 64
LOS: 0 days | Discharge: HOME | End: 2024-10-12
Attending: STUDENT IN AN ORGANIZED HEALTH CARE EDUCATION/TRAINING PROGRAM
Payer: MEDICARE

## 2024-10-12 VITALS
HEART RATE: 98 BPM | TEMPERATURE: 98 F | SYSTOLIC BLOOD PRESSURE: 165 MMHG | HEIGHT: 70 IN | DIASTOLIC BLOOD PRESSURE: 118 MMHG | RESPIRATION RATE: 18 BRPM | OXYGEN SATURATION: 100 % | WEIGHT: 177.91 LBS

## 2024-10-12 DIAGNOSIS — I10 ESSENTIAL (PRIMARY) HYPERTENSION: ICD-10-CM

## 2024-10-12 DIAGNOSIS — N47.2 PARAPHIMOSIS: ICD-10-CM

## 2024-10-12 DIAGNOSIS — F29 UNSPECIFIED PSYCHOSIS NOT DUE TO A SUBSTANCE OR KNOWN PHYSIOLOGICAL CONDITION: ICD-10-CM

## 2024-10-12 DIAGNOSIS — I25.10 ATHEROSCLEROTIC HEART DISEASE OF NATIVE CORONARY ARTERY WITHOUT ANGINA PECTORIS: ICD-10-CM

## 2024-10-12 DIAGNOSIS — E78.5 HYPERLIPIDEMIA, UNSPECIFIED: ICD-10-CM

## 2024-10-12 DIAGNOSIS — N48.89 OTHER SPECIFIED DISORDERS OF PENIS: ICD-10-CM

## 2024-10-12 DIAGNOSIS — Z98.61 CORONARY ANGIOPLASTY STATUS: ICD-10-CM

## 2024-10-12 DIAGNOSIS — F20.9 SCHIZOPHRENIA, UNSPECIFIED: ICD-10-CM

## 2024-10-12 PROCEDURE — 99284 EMERGENCY DEPT VISIT MOD MDM: CPT

## 2024-10-12 RX ORDER — DEXTROSE 50 % IN WATER 50 %
15 SYRINGE (ML) INTRAVENOUS ONCE
Refills: 0 | Status: COMPLETED | OUTPATIENT
Start: 2024-10-12 | End: 2024-10-12

## 2024-10-12 RX ORDER — LIDOCAINE HCL/PF 10 MG/ML
10 VIAL (ML) INJECTION ONCE
Refills: 0 | Status: COMPLETED | OUTPATIENT
Start: 2024-10-12 | End: 2024-10-12

## 2024-12-07 ENCOUNTER — EMERGENCY (EMERGENCY)
Facility: HOSPITAL | Age: 64
LOS: 0 days | Discharge: HOME | End: 2024-12-07
Attending: STUDENT IN AN ORGANIZED HEALTH CARE EDUCATION/TRAINING PROGRAM
Payer: MEDICARE

## 2024-12-07 VITALS
HEART RATE: 111 BPM | SYSTOLIC BLOOD PRESSURE: 154 MMHG | HEIGHT: 70 IN | DIASTOLIC BLOOD PRESSURE: 98 MMHG | OXYGEN SATURATION: 97 % | WEIGHT: 154.98 LBS | TEMPERATURE: 98 F | RESPIRATION RATE: 19 BRPM

## 2024-12-07 VITALS
RESPIRATION RATE: 15 BRPM | DIASTOLIC BLOOD PRESSURE: 71 MMHG | TEMPERATURE: 98 F | SYSTOLIC BLOOD PRESSURE: 109 MMHG | HEART RATE: 79 BPM | OXYGEN SATURATION: 98 %

## 2024-12-07 DIAGNOSIS — I10 ESSENTIAL (PRIMARY) HYPERTENSION: ICD-10-CM

## 2024-12-07 DIAGNOSIS — F20.9 SCHIZOPHRENIA, UNSPECIFIED: ICD-10-CM

## 2024-12-07 DIAGNOSIS — I25.10 ATHEROSCLEROTIC HEART DISEASE OF NATIVE CORONARY ARTERY WITHOUT ANGINA PECTORIS: ICD-10-CM

## 2024-12-07 DIAGNOSIS — N48.89 OTHER SPECIFIED DISORDERS OF PENIS: ICD-10-CM

## 2024-12-07 DIAGNOSIS — E78.5 HYPERLIPIDEMIA, UNSPECIFIED: ICD-10-CM

## 2024-12-07 PROCEDURE — 99284 EMERGENCY DEPT VISIT MOD MDM: CPT | Mod: 25

## 2024-12-07 PROCEDURE — 99284 EMERGENCY DEPT VISIT MOD MDM: CPT

## 2024-12-07 PROCEDURE — 54450 PREPUTIAL STRETCHING: CPT
